# Patient Record
Sex: MALE | Race: WHITE
[De-identification: names, ages, dates, MRNs, and addresses within clinical notes are randomized per-mention and may not be internally consistent; named-entity substitution may affect disease eponyms.]

---

## 2019-04-01 ENCOUNTER — HOSPITAL ENCOUNTER (OUTPATIENT)
Dept: HOSPITAL 75 - RAD FS | Age: 79
End: 2019-04-01
Attending: NURSE PRACTITIONER
Payer: MEDICARE

## 2019-04-01 DIAGNOSIS — R05: Primary | ICD-10-CM

## 2019-04-01 DIAGNOSIS — B96.89: ICD-10-CM

## 2019-04-01 DIAGNOSIS — R09.89: ICD-10-CM

## 2019-04-01 PROCEDURE — 71046 X-RAY EXAM CHEST 2 VIEWS: CPT

## 2019-04-01 NOTE — DIAGNOSTIC IMAGING REPORT
INDICATION: Cough and congestion.



FINDINGS: The lungs are clear. The heart and vessels are normal.

There is no effusion or pneumothorax.



IMPRESSION: No acute appearing abnormality.



Dictated by: 



  Dictated on workstation # [id number]

## 2021-01-16 ENCOUNTER — HOSPITAL ENCOUNTER (EMERGENCY)
Dept: HOSPITAL 75 - ER FS | Age: 81
Discharge: HOME | End: 2021-01-16
Payer: MEDICARE

## 2021-01-16 VITALS — WEIGHT: 242.51 LBS | HEIGHT: 66.02 IN | BODY MASS INDEX: 38.97 KG/M2

## 2021-01-16 VITALS — DIASTOLIC BLOOD PRESSURE: 49 MMHG | SYSTOLIC BLOOD PRESSURE: 126 MMHG

## 2021-01-16 DIAGNOSIS — R53.1: ICD-10-CM

## 2021-01-16 DIAGNOSIS — U07.1: Primary | ICD-10-CM

## 2021-01-16 DIAGNOSIS — E86.0: ICD-10-CM

## 2021-01-16 LAB
ALBUMIN SERPL-MCNC: 3.8 GM/DL (ref 3.2–4.5)
ALP SERPL-CCNC: 57 U/L (ref 40–136)
ALT SERPL-CCNC: 46 U/L (ref 0–55)
BASOPHILS # BLD AUTO: 0 10^3/UL (ref 0–0.1)
BASOPHILS NFR BLD AUTO: 1 % (ref 0–10)
BILIRUB SERPL-MCNC: 0.6 MG/DL (ref 0.1–1)
BUN/CREAT SERPL: 16
CALCIUM SERPL-MCNC: 9.1 MG/DL (ref 8.5–10.1)
CHLORIDE SERPL-SCNC: 99 MMOL/L (ref 98–107)
CO2 SERPL-SCNC: 23 MMOL/L (ref 21–32)
CREAT SERPL-MCNC: 1.07 MG/DL (ref 0.6–1.3)
EOSINOPHIL # BLD AUTO: 0 10^3/UL (ref 0–0.3)
EOSINOPHIL NFR BLD AUTO: 0 % (ref 0–10)
GFR SERPLBLD BASED ON 1.73 SQ M-ARVRAT: > 60 ML/MIN
GLUCOSE SERPL-MCNC: 117 MG/DL (ref 70–105)
HCT VFR BLD CALC: 43 % (ref 40–54)
HGB BLD-MCNC: 14.7 G/DL (ref 13.3–17.7)
LYMPHOCYTES # BLD AUTO: 2 X 10^3 (ref 1–4)
LYMPHOCYTES NFR BLD AUTO: 34 % (ref 12–44)
MANUAL DIFFERENTIAL PERFORMED BLD QL: NO
MCH RBC QN AUTO: 33 PG (ref 25–34)
MCHC RBC AUTO-ENTMCNC: 34 G/DL (ref 32–36)
MCV RBC AUTO: 96 FL (ref 80–99)
MONOCYTES # BLD AUTO: 0.9 X 10^3 (ref 0–1)
MONOCYTES NFR BLD AUTO: 15 % (ref 0–12)
NEUTROPHILS # BLD AUTO: 3 X 10^3 (ref 1.8–7.8)
NEUTROPHILS NFR BLD AUTO: 50 % (ref 42–75)
PLATELET # BLD: 150 10^3/UL (ref 130–400)
PMV BLD AUTO: 10.5 FL (ref 7.4–10.4)
POTASSIUM SERPL-SCNC: 4.4 MMOL/L (ref 3.6–5)
PROT SERPL-MCNC: 7.4 GM/DL (ref 6.4–8.2)
SODIUM SERPL-SCNC: 135 MMOL/L (ref 135–145)
WBC # BLD AUTO: 6 10^3/UL (ref 4.3–11)

## 2021-01-16 PROCEDURE — 93005 ELECTROCARDIOGRAM TRACING: CPT

## 2021-01-16 PROCEDURE — 86141 C-REACTIVE PROTEIN HS: CPT

## 2021-01-16 PROCEDURE — 87635 SARS-COV-2 COVID-19 AMP PRB: CPT

## 2021-01-16 PROCEDURE — 71045 X-RAY EXAM CHEST 1 VIEW: CPT

## 2021-01-16 PROCEDURE — 84145 PROCALCITONIN (PCT): CPT

## 2021-01-16 PROCEDURE — 84484 ASSAY OF TROPONIN QUANT: CPT

## 2021-01-16 PROCEDURE — 85025 COMPLETE CBC W/AUTO DIFF WBC: CPT

## 2021-01-16 PROCEDURE — 99284 EMERGENCY DEPT VISIT MOD MDM: CPT

## 2021-01-16 PROCEDURE — 36415 COLL VENOUS BLD VENIPUNCTURE: CPT

## 2021-01-16 PROCEDURE — 87804 INFLUENZA ASSAY W/OPTIC: CPT

## 2021-01-16 PROCEDURE — 80053 COMPREHEN METABOLIC PANEL: CPT

## 2021-01-16 NOTE — DIAGNOSTIC IMAGING REPORT
INDICATION:  weakness.  



TECHNIQUE:  Single view chest 10:15 AM.



CORRELATION STUDY:  04/01/2019



FINDINGS: 

The heart size, mediastinal configuration and pulmonary

vascularity are within normal limits.  

Minimal opacity left lung base. Could reflect small area of

developing infiltrate. Remaining lung fields otherwise clear.



IMPRESSION: 

1. Opacity left lung base could be a developing area of

infiltrate. Follow-up imaging if clinically warranted.



Dictated by: 



  Dictated on workstation # [id number]

## 2021-01-16 NOTE — ED CARDIAC GENERAL
History of Present Illness


General


Stated Complaint:  LOW HR; ARRHYTHMIA


Source:  patient


Exam Limitations:  no limitations





History of Present Illness


Date Seen by Provider:  Jan 16, 2021


Time Seen by Provider:  10:07


Initial Comments


80-year-old male presents with 2-3 days of feeling weak and decreased appetite. 

This morning was using a pulse oximeter and stated that his heart rate was 

fluctuating between 30 and 100 and he was feeling palpitations at the time.  

Denies history of heart or lung problems, denies recent illness fever or chills.

 Denies known exposure to the [place] virus.  Only significant past medical 

history is hypertension and prostate Hypertrophy.





Allergies and Home Medications


Allergies


Coded Allergies:  


     No Known Drug Allergies (Unverified , 1/16/21)





Home Medications


Azithromycin 250 Mg Tablet, 250 MG PO UD


   TAKE 2 TABLETS ON DAY ONE THEN TAKE 1 TABLET DAILY FOR FOUR MORE DAYS 


   Prescribed by: RENY NAJERA on 1/16/21 1131





Patient Home Medication List


Home Medication List Reviewed:  Yes





Review of Systems


Review of Systems


Constitutional:  No chills, No diaphoresis; dizziness; No fever; malaise, 

weakness


EENTM:  No Symptoms Reported


Respiratory:  No Symptoms Reported; Denies Cough, Denies Shortness of Air


Cardiovascular:  Denies Chest Pain, Denies Edema; Lightheadedness, Palpitations;

Denies Syncope


Gastrointestinal:  Denies Abdomen Distended, Denies Abdominal Pain, Denies 

Constipated, Denies Nausea; Poor Appetite, Poor Fluid Intake; Denies Vomiting


Musculoskeletal:  No back pain, No joint pain, No neck pain


Skin:  No change in color, No lesions, No rash


Psychiatric/Neurological:  Denies Headache, Denies Numbness, Denies Paresthesia,

Denies Seizure, Denies Tingling, Denies Tremors; Weakness





Past Medical-Social-Family Hx


Past Med/Social Hx:  Reviewed Nursing Past Med/Soc Hx


Patient Social History


Alcohol Use:  Denies Use





Physical Exam


Vital Signs





Vital Signs - First Documented








 1/16/21





 10:08


 


Temp 37.0


 


Pulse 96


 


Resp 15


 


B/P (MAP) 134/66 (88)


 


Pulse Ox 95


 


O2 Delivery Room Air





Capillary Refill :


Height, Weight, BMI


Height: '"


Weight: lbs. oz. kg;  BMI


Method:


General Appearance:  No Apparent Distress, WD/WN


HEENT:  PERRL/EOMI, Normal ENT Inspection


Neck:  Non Tender, Supple


Respiratory:  Chest Non Tender, Lungs Clear, Normal Breath Sounds, No Accessory 

Muscle Use, No Respiratory Distress


Cardiovascular:  Regular Rate, Rhythm, No Edema, No Gallop, No JVD, No Murmur, 

Normal Peripheral Pulses


Gastrointestinal:  Normal Bowel Sounds, Non Tender, Soft


Extremity:  Normal Capillary Refill, Normal Inspection, Non Tender, No Calf 

Tenderness


Neurologic/Psychiatric:  Alert, Oriented x3, No Motor/Sensory Deficits, Normal 

Mood/Affect, CNs II-XII Norm as Tested


Skin:  Normal Color, Warm/Dry





Progress/Results/Core Measures


Results/Orders


Lab Results





Laboratory Tests








Test


 1/16/21


10:14 1/16/21


10:30 Range/Units


 


 


White Blood Count


 6.0 


 


 4.3-11.0


10^3/uL


 


Red Blood Count


 4.52 


 


 4.35-5.85


10^6/uL


 


Hemoglobin 14.7   13.3-17.7  G/DL


 


Hematocrit 43   40-54  %


 


Mean Corpuscular Volume 96   80-99  FL


 


Mean Corpuscular Hemoglobin 33   25-34  PG


 


Mean Corpuscular Hemoglobin


Concent 34 


 


 32-36  G/DL





 


Red Cell Distribution Width 12.5   10.0-14.5  %


 


Platelet Count


 150 


 


 130-400


10^3/uL


 


Mean Platelet Volume 10.5 H  7.4-10.4  FL


 


Immature Granulocyte % (Auto) 0    %


 


Neutrophils (%) (Auto) 50   42-75  %


 


Lymphocytes (%) (Auto) 34   12-44  %


 


Monocytes (%) (Auto) 15 H  0-12  %


 


Eosinophils (%) (Auto) 0   0-10  %


 


Basophils (%) (Auto) 1   0-10  %


 


Neutrophils # (Auto) 3.0   1.8-7.8  X 10^3


 


Lymphocytes # (Auto) 2.0   1.0-4.0  X 10^3


 


Monocytes # (Auto) 0.9   0.0-1.0  X 10^3


 


Eosinophils # (Auto)


 0.0 


 


 0.0-0.3


10^3/uL


 


Basophils # (Auto)


 0.0 


 


 0.0-0.1


10^3/uL


 


Immature Granulocyte # (Auto)


 0.0 


 


 0.0-0.1


10^3/uL


 


Sodium Level 135   135-145  MMOL/L


 


Potassium Level 4.4   3.6-5.0  MMOL/L


 


Chloride Level 99     MMOL/L


 


Carbon Dioxide Level 23   21-32  MMOL/L


 


Anion Gap 13   5-14  MMOL/L


 


Blood Urea Nitrogen 17   7-18  MG/DL


 


Creatinine


 1.07 


 


 0.60-1.30


MG/DL


 


Estimat Glomerular Filtration


Rate > 60 


 


  





 


BUN/Creatinine Ratio 16    


 


Glucose Level 117 H    MG/DL


 


Calcium Level 9.1   8.5-10.1  MG/DL


 


Corrected Calcium 9.3   8.5-10.1  MG/DL


 


Total Bilirubin 0.6   0.1-1.0  MG/DL


 


Aspartate Amino Transf


(AST/SGOT) 49 H


 


 5-34  U/L





 


Alanine Aminotransferase


(ALT/SGPT) 46 


 


 0-55  U/L





 


Alkaline Phosphatase 57     U/L


 


Troponin I < 0.30   <0.30  NG/ML


 


C-Reactive Protein 1.55 H  <0.50  MG/DL


 


Total Protein 7.4   6.4-8.2  GM/DL


 


Albumin 3.8   3.2-4.5  GM/DL








Micro Results





Microbiology


1/16/21 Influenza Types A,B Antigen (RUPESH) - Final, Complete


          





My Orders





Orders - RENY NAJERA DO


Ed Iv/Invasive Line Start (1/16/21 10:11)


Troponin I Fs (1/16/21 10:11)


Chest 1 View Ap/Pa Only (1/16/21 10:11)


Ekg Tracing (1/16/21 10:11)


Cbc With Automated Diff (1/16/21 10:11)


Comprehensive Metabolic Panel (1/16/21 10:11)


Urinalysis (1/16/21 10:11)


Crp Fs (1/16/21 10:24)


Procalcitonin (Pct) (1/16/21 10:24)


Coronavirus Sars-Cov-2 So 2019 (1/16/21 10:24)


Influenza A And B Antigens (1/16/21 10:28)


Ns Iv 1000 Ml (Sodium Chloride 0.9%) (1/16/21 11:00)





Vital Signs/I&O











 1/16/21 1/16/21





 10:08 11:35


 


Temp 37.0 37.0


 


Pulse 96 88


 


Resp 15 16


 


B/P (MAP) 134/66 (88) 126/49 (88)


 


Pulse Ox 95 94


 


O2 Delivery Room Air 











Progress


Progress Note :  


Progress Note


Patient without any respiratory symptoms currently or in the past few weeks.  

Overall, feeling weakness and decreased appetite for 2 weeks.  Labs are 

completely normal and CXR with questionable opacity in LLL.  Decision to treat w

Azithromycin and advise f/u w PCP in 1-2 weeks for re-evaluation.  Also, tested 

for C-19 which will be pending.  HR improved (from 100 to 80's) after 1 liter NS

iv.  





Reassurance given about his concern over a fluctuating heart rate this morning 

from his pulse oximeter, explained that it may not have been picking up his 

pulse accurately as he said it was going up and down.  Also during his entire ER

stay, he was watched on the monitor and his heart rate was consistently in the 9

0s until after he was given fluid.


Initial ECG Impression Date:  Jan 16, 2021


Initial ECG Impression Time:  10:15


Initial ECG Rhythm:  Normal Sinus


Initial ECG Intervals:  Normal


Initial ECG Impression:  Normal


Initial ECG Comparisson:  No Previous ECG Available





Diagnostic Imaging





   Diagonstic Imaging:  Xray


   Plain Films/CT/US/NM/MRI:  chest


Comments


Date of Exam:01/16/21





CHEST 1 VIEW AP/PA ONLY








INDICATION:  weakness.  





TECHNIQUE:  Single view chest 10:15 AM.





CORRELATION STUDY:  04/01/2019





FINDINGS: 


The heart size, mediastinal configuration and pulmonary


vascularity are within normal limits.  


Minimal opacity left lung base. Could reflect small area of


developing infiltrate. Remaining lung fields otherwise clear.





IMPRESSION: 


1. Opacity left lung base could be a developing area of


infiltrate. Follow-up imaging if clinically warranted.





  Dictated on workstation # [id number]








Dict:   01/16/21 1030


Trans:   01/16/21 1043


STEVEN [id number]





Interpreted by:     LILIAM ROMERO DO


Electronically signed by:





Departure


Impression





   Primary Impression:  


   Weakness


   Additional Impression:  


   Mild dehydration


Disposition:  01 HOME, SELF-CARE


Condition:  Stable





Departure-Patient Inst.


Decision time for Depature:  11:08


Referrals:  


SELFREBEKA MD (PCP/Family)


Primary Care Physician


Patient Instructions:  Dehydration, Adult ED, Weakness ED





Add. Discharge Instructions:  


See your doctor in 1 week for follow up evaluation.  It is also advised you get 

a follow up chest x-ray from your doctor in 1 or 2 weeks





The antibiotic is given you for a potential infection in your left lower lung. 





You will get a phone call regarding the results of your Covid test


Scripts


Azithromycin (Azithromycin) 250 Mg Tablet


250 MG PO UD, #6 TAB


   TAKE 2 TABLETS ON DAY ONE THEN TAKE 1 TABLET DAILY FOR FOUR MORE DAYS


   Prov: RENY NAJERA DO         1/16/21











RENY NAJERA DO         Jan 16, 2021 10:07

## 2021-01-21 ENCOUNTER — HOSPITAL ENCOUNTER (EMERGENCY)
Dept: HOSPITAL 75 - ER FS | Age: 81
Discharge: HOME | End: 2021-01-21
Payer: MEDICARE

## 2021-01-21 VITALS — SYSTOLIC BLOOD PRESSURE: 151 MMHG | DIASTOLIC BLOOD PRESSURE: 65 MMHG

## 2021-01-21 VITALS — WEIGHT: 220.46 LBS | HEIGHT: 67.72 IN | BODY MASS INDEX: 33.8 KG/M2

## 2021-01-21 DIAGNOSIS — U07.1: Primary | ICD-10-CM

## 2021-01-21 LAB
ALBUMIN SERPL-MCNC: 3.5 GM/DL (ref 3.2–4.5)
ALP SERPL-CCNC: 47 U/L (ref 40–136)
ALT SERPL-CCNC: 40 U/L (ref 0–55)
BASOPHILS # BLD AUTO: 0 10^3/UL (ref 0–0.1)
BASOPHILS NFR BLD AUTO: 0 % (ref 0–10)
BILIRUB SERPL-MCNC: 0.6 MG/DL (ref 0.1–1)
BUN/CREAT SERPL: 19
CALCIUM SERPL-MCNC: 9.2 MG/DL (ref 8.5–10.1)
CHLORIDE SERPL-SCNC: 100 MMOL/L (ref 98–107)
CO2 SERPL-SCNC: 25 MMOL/L (ref 21–32)
CREAT SERPL-MCNC: 1 MG/DL (ref 0.6–1.3)
EOSINOPHIL # BLD AUTO: 0 10^3/UL (ref 0–0.3)
EOSINOPHIL NFR BLD AUTO: 0 % (ref 0–10)
GFR SERPLBLD BASED ON 1.73 SQ M-ARVRAT: > 60 ML/MIN
GLUCOSE SERPL-MCNC: 122 MG/DL (ref 70–105)
HCT VFR BLD CALC: 40 % (ref 40–54)
HGB BLD-MCNC: 13.8 G/DL (ref 13.3–17.7)
LYMPHOCYTES # BLD AUTO: 1.2 X 10^3 (ref 1–4)
LYMPHOCYTES NFR BLD AUTO: 13 % (ref 12–44)
MANUAL DIFFERENTIAL PERFORMED BLD QL: NO
MCH RBC QN AUTO: 33 PG (ref 25–34)
MCHC RBC AUTO-ENTMCNC: 35 G/DL (ref 32–36)
MCV RBC AUTO: 94 FL (ref 80–99)
MONOCYTES # BLD AUTO: 1.1 X 10^3 (ref 0–1)
MONOCYTES NFR BLD AUTO: 11 % (ref 0–12)
NEUTROPHILS # BLD AUTO: 7.2 X 10^3 (ref 1.8–7.8)
NEUTROPHILS NFR BLD AUTO: 76 % (ref 42–75)
PLATELET # BLD: 212 10^3/UL (ref 130–400)
PMV BLD AUTO: 10.6 FL (ref 7.4–10.4)
POTASSIUM SERPL-SCNC: 4.2 MMOL/L (ref 3.6–5)
PROT SERPL-MCNC: 6.9 GM/DL (ref 6.4–8.2)
SODIUM SERPL-SCNC: 135 MMOL/L (ref 135–145)
WBC # BLD AUTO: 9.6 10^3/UL (ref 4.3–11)

## 2021-01-21 PROCEDURE — 80053 COMPREHEN METABOLIC PANEL: CPT

## 2021-01-21 PROCEDURE — 71045 X-RAY EXAM CHEST 1 VIEW: CPT

## 2021-01-21 PROCEDURE — 93041 RHYTHM ECG TRACING: CPT

## 2021-01-21 PROCEDURE — 85025 COMPLETE CBC W/AUTO DIFF WBC: CPT

## 2021-01-21 PROCEDURE — 86141 C-REACTIVE PROTEIN HS: CPT

## 2021-01-21 PROCEDURE — 93005 ELECTROCARDIOGRAM TRACING: CPT

## 2021-01-21 PROCEDURE — 84484 ASSAY OF TROPONIN QUANT: CPT

## 2021-01-21 PROCEDURE — 36415 COLL VENOUS BLD VENIPUNCTURE: CPT

## 2021-01-21 NOTE — DIAGNOSTIC IMAGING REPORT
INDICATION: Cough and COVID infection.



TECHNIQUE: Portable AP view of the chest is obtained.



FINDINGS: Since 01/16/2021, there are continued predominantly

linear markings in the lung bases which may be due to atelectasis

or possible scarring. There is no pneumothorax. No significant

lobar consolidation or pleural fluid is identified.



IMPRESSION: Basilar atelectasis and/or pneumonitis versus

developing scarring, bilaterally.



Dictated by: 



  Dictated on workstation # [id number]

## 2021-01-21 NOTE — ED GENERAL
General


Chief Complaint:  Cough/Cold/Flu Symptoms


Stated Complaint:  COUGH,DIZZINESS


Nursing Triage Note:  


PT WAS POSITIVE FOR COVID 5 DAYS AGO.  HE COMES TO ER TODAY BECAUSE HE STILL HAS




A COUGH AND FEELS DIZZY SOMETIMES AFTER COUGHING.


Nursing Sepsis Screen:  No Definite Risk


Source of Information:  Patient





History of Present Illness


Date Seen by Provider:  2021


Time Seen by Provider:  10:15


Initial Comments


80-year-old male presenting with complaints of feeling weak, dizzy at times, 

continued cough since being diagnosed with Covid when seen in ED 2021.  

He states that he completed the Z-Marky that was prescribed to him and he has 1 or

2 days of prednisone left.  Dr. Singletary had prescribed Mucinex DM as well as 

Tessalon Perles to help with his coughing.  He occasionally will bring up some 

clear or thick white sputum.  He denies any fever or chills.  He had called the 

clinic about his symptoms and they told him to come to the emergency department.





Allergies and Home Medications


Allergies


Coded Allergies:  


     No Known Drug Allergies (Unverified , 21)





Home Medications


Albuterol Sulfate 2.5 Mg/3 Ml Vial.neb, 2.5 MG INH Q4H PRN for COUGH


   Prescribed by: HARDEEP MONTOYA on 21 1133


Azithromycin 250 Mg Tablet, 250 MG PO UD


   TAKE 2 TABLETS ON DAY ONE THEN TAKE 1 TABLET DAILY FOR FOUR MORE DAYS 


   Prescribed by: RENY NAJERA on 21 1131





Patient Home Medication List


Home Medication List Reviewed:  Yes





Review of Systems


Review of Systems


Constitutional:  No chills; dizziness (Intermittent); No fever; malaise


EENTM:  nose congestion; No epistaxis


Respiratory:  see HPI, cough


Cardiovascular:  No chest pain


Gastrointestinal:  no symptoms reported


Genitourinary:  no symptoms reported


Musculoskeletal:  no symptoms reported


Skin:  no symptoms reported


Psychiatric/Neurological:  Weakness (Generalized)


Hematologic/Lymphatic:  No Symptoms Reported





Past Medical-Social-Family Hx


Past Med/Social Hx:  Reviewed Nursing Past Med/Soc Hx


Patient Social History


Alcohol Use:  Denies Use


Recent Infectious Disease Expo:  Yes


Recent Hopitalizations:  No





Seasonal Allergies


Seasonal Allergies:  No





Past Medical History


Surgeries:  Yes (Right ankle repair, Kidney stone retrieval)


Appendectomy, Orthopedic, Tonsillectomy


Respiratory:  No


Cardiac:  Yes


Hypertension


Neurological:  No


Genitourinary:  Yes


Benign Prostatic Hyperpl


Gastrointestinal:  No


Musculoskeletal:  No


Endocrine:  No


HEENT:  No


Cancer:  No


Psychosocial:  No


Blood Disorders:  No





Physical Exam


Vital Signs





Vital Signs - First Documented








 21





 10:27


 


Temp 37.6


 


Pulse 91


 


Resp 20


 


B/P (MAP) 167/67 (100)


 


Pulse Ox 100


 


O2 Delivery Room Air





Capillary Refill : Less Than 3 Seconds


Height, Weight, BMI


Height: '"


Weight: lbs. oz. kg; 33.00 BMI


Method:


General Appearance:  No Apparent Distress, WD/WN


HEENT:  PERRL/EOMI, Pharynx Normal


Neck:  Full Range of Motion, Normal Inspection, Non Tender, Supple


Respiratory:  Chest Non Tender, Lungs Clear, Normal Breath Sounds, No Accessory 

Muscle Use, No Respiratory Distress


Cardiovascular:  Regular Rate, Rhythm, No Murmur, Normal Peripheral Pulses


Gastrointestinal:  Normal Bowel Sounds, No Pulsatile Mass, Non Tender, Soft


Extremity:  Normal Capillary Refill, Non Tender


Neurologic/Psychiatric:  Alert, Oriented x3, No Motor/Sensory Deficits, Normal 

Mood/Affect, CNs II-XII Norm as Tested


Skin:  Normal Color, Warm/Dry





Progress/Results/Core Measures


Suspected Sepsis


Recent Fever Within 48 Hours:  No


Infection Criteria Present:  None


New/Unexplained  Altered Menta:  No


Sepsis Screen:  No Definite Risk


SIRS


Temperature: 


Pulse: 91 


Respiratory Rate: 20


 


Laboratory Tests


21 10:40: White Blood Count 9.6


Blood Pressure 167 /67 


Mean: 100


 





Laboratory Tests


21 10:40: 


Creatinine 1.00, Platelet Count 212, Total Bilirubin 0.6








Results/Orders


Lab Results





Laboratory Tests








Test


 21


10:40 Range/Units


 


 


White Blood Count


 9.6 


 4.3-11.0


10^3/uL


 


Red Blood Count


 4.21 L


 4.35-5.85


10^6/uL


 


Hemoglobin 13.8  13.3-17.7  G/DL


 


Hematocrit 40  40-54  %


 


Mean Corpuscular Volume 94  80-99  FL


 


Mean Corpuscular Hemoglobin 33  25-34  PG


 


Mean Corpuscular Hemoglobin


Concent 35 


 32-36  G/DL





 


Red Cell Distribution Width 12.4  10.0-14.5  %


 


Platelet Count


 212 


 130-400


10^3/uL


 


Mean Platelet Volume 10.6 H 7.4-10.4  FL


 


Immature Granulocyte % (Auto) 0   %


 


Neutrophils (%) (Auto) 76 H 42-75  %


 


Lymphocytes (%) (Auto) 13  12-44  %


 


Monocytes (%) (Auto) 11  0-12  %


 


Eosinophils (%) (Auto) 0  0-10  %


 


Basophils (%) (Auto) 0  0-10  %


 


Neutrophils # (Auto) 7.2  1.8-7.8  X 10^3


 


Lymphocytes # (Auto) 1.2  1.0-4.0  X 10^3


 


Monocytes # (Auto) 1.1 H 0.0-1.0  X 10^3


 


Eosinophils # (Auto)


 0.0 


 0.0-0.3


10^3/uL


 


Basophils # (Auto)


 0.0 


 0.0-0.1


10^3/uL


 


Immature Granulocyte # (Auto)


 0.0 


 0.0-0.1


10^3/uL


 


Sodium Level 135  135-145  MMOL/L


 


Potassium Level 4.2  3.6-5.0  MMOL/L


 


Chloride Level 100    MMOL/L


 


Carbon Dioxide Level 25  21-32  MMOL/L


 


Anion Gap 10  5-14  MMOL/L


 


Blood Urea Nitrogen 19 H 7-18  MG/DL


 


Creatinine


 1.00 


 0.60-1.30


MG/DL


 


Estimat Glomerular Filtration


Rate > 60 


  





 


BUN/Creatinine Ratio 19   


 


Glucose Level 122 H   MG/DL


 


Calcium Level 9.2  8.5-10.1  MG/DL


 


Corrected Calcium 9.6  8.5-10.1  MG/DL


 


Total Bilirubin 0.6  0.1-1.0  MG/DL


 


Aspartate Amino Transf


(AST/SGOT) 36 H


 5-34  U/L





 


Alanine Aminotransferase


(ALT/SGPT) 40 


 0-55  U/L





 


Alkaline Phosphatase 47    U/L


 


Troponin I < 0.30  <0.30  NG/ML


 


C-Reactive Protein 2.22 H <0.50  MG/DL


 


Total Protein 6.9  6.4-8.2  GM/DL


 


Albumin 3.5  3.2-4.5  GM/DL








My Orders





Orders - HARDEEP MONTOYA MD


Monitor-Rhythm Ecg Trace Only (21 10:38)


Ed Iv/Invasive Line Start (21 10:38)


Cbc With Automated Diff (21 10:38)


Comprehensive Metabolic Panel (21 10:38)


Crp Fs (21 10:38)


Troponin I Fs (21 10:38)


Ekg Tracing (21 10:38)


Ns Iv 1000 Ml (Sodium Chloride 0.9%) (21 10:45)


Rx-Albuterol Inhaler (Rx-Ventolin Hfa) (21 10:45)


Chest 1 View Ap/Pa Only (21 11:01)





Medications Given in ED





Current Medications








 Medications  Dose


 Ordered  Sig/Mk


 Route  Start Time


 Stop Time Status Last Admin


Dose Admin


 


 Albuterol Sulfate  2 PUFFS IH


 Q1HR STA  RTQ4HR  PRN


 IH  21 10:45


 21 11:47 DC 21 10:49


18 GM








Vital Signs/I&O











 21





 10:27 11:39


 


Temp 37.6 36.5


 


Pulse 91 77


 


Resp 20 16


 


B/P (MAP) 167/67 (100) 151/65


 


Pulse Ox 100 100


 


O2 Delivery Room Air Room Air





Capillary Refill : Less Than 3 Seconds








Blood Pressure Mean:                    100








Progress Note #1:  


Progress Note


Check basic labs and x-ray.  Give a liter of normal saline to try and help with 

hydration.  Patient's oxygen saturation is 100% on room air.  His lungs are 

clear on exam.  We will see what his chest x-ray shows as well as making sure 

that his electrolytes appear okay.  Adding an inhaler and breathing treatments 

may help with his cough.  Encouraged to continue the Mucinex DM and Tessalon 

Perles as needed.


Progress Note #2:  


Progress Note


CBC is not showing any acute significant abnormality.  His white count is 9.6 

with a slight left shift.  His electrocardiogram shows sinus rhythm without isch

emic changes.  He does state that he has a nebulizer at home but has not been 

using it because he was not told to use it for his symptoms


Progress Note #3:  


   Time:  11:23


Progress Note


Chemistry without acute significant abnormality as well. He does have elevated 

CRP to go with Covid diagnosis. CXR appears stable from 2021. Will d

ischarge to home and have him use nebulizer and inhaler with spacer to help with

cough. Encourage fluids and rest. Counseled that Covid will make him feel bad 

and can take a while to recover from it. Every one is different but continue 

with symptomatic treatment for his cough and push fluids and rest as well as 

Vitamin C to help recover from viral infection.





ECG


Initial ECG Impression Date:  2021


Initial ECG Impression Time:  10:44


Initial ECG Rate:  81


Initial ECG Rhythm:  Normal Sinus


Initial ECG Comparisson:  No Previous ECG Available


Comment


Normal sinus rhythm with a heart rate of 81 bpm.  IA interval 154 ms.  QT 

interval 357 ms with a QTc interval 415 ms.  There is no acute ST elevation.  

There is no prior tracing available for comparison.





Diagnostic Imaging





   Diagonstic Imaging:  Xray


   Plain Films/CT/US/NM/MRI:  chest


Comments


                 [facility]


                                [place], [place]





NAME:   ROMAN PAINTING


[facility] REC#:   [id number]


ACCOUNT#:   [id number]


PT STATUS:   DEP ER


:   1940


PHYSICIAN:   HARDEEP MONTOYA MD


ADMIT DATE:   21/ER FS


                                  ***Signed***


Date of Exam:21





CHEST 1 VIEW AP/PA ONLY








INDICATION: Cough and COVID infection.





TECHNIQUE: Portable AP view of the chest is obtained.





FINDINGS: Since 2021, there are continued predominantly


linear markings in the lung bases which may be due to atelectasis


or possible scarring. There is no pneumothorax. No significant


lobar consolidation or pleural fluid is identified.





IMPRESSION: Basilar atelectasis and/or pneumonitis versus


developing scarring, bilaterally.





Dictated by: 





  Dictated on workstation # [id number]








Dict:   21 1134


Trans:   21 1154


AS6 [id number]





Interpreted by:     MARGARITA MOLINA MD


Electronically signed by: MARGARITA MOLINA MD 21 1154





Departure


Impression





   Primary Impression:  


   Cough


   Additional Impression:  


   COVID-19 virus infection


Disposition:   HOME, SELF-CARE


Condition:  Stable





Departure-Patient Inst.


Decision time for Depature:  11:35


Referrals:  


REBKEA SINGLETARY MD (PCP/Family)


Primary Care Physician


Patient Instructions:  Coronavirus Disease 2019 (COVID-19) ED, Cough, Adult ED, 

How to Use Your Metered Dose Inhaler (Adults), How to Use a Spacer





Add. Discharge Instructions:  


Use inhaler with spacer or your nebulizer machine to do breathing treatments 

every 4 to 6 hours as needed to help with cough and shortness of breath.





Follow up with Dr. Singletary and [facility] clinic for continued concerns.





Continue with cough medicine as prescribed from Dr. Singletary. Continue with 

humidifier at the bedside. 


Make sure you are drinking plenty of fluids and staying well hydrated. 





All discharge instructions reviewed with patient and/or family. Voiced 

understanding.


Scripts


Albuterol Sulfate (Albuterol Sulfate) 2.5 Mg/3 Ml Vial.neb


2.5 MG INH Q4H PRN for COUGH for 10 Days, #75 ML 1 Refill


   Prov: HARDEEP MONTOYA MD         21











HARDEEP MONTOYA MD               2021 10:40

## 2022-03-31 ENCOUNTER — HOSPITAL ENCOUNTER (OUTPATIENT)
Dept: HOSPITAL 75 - ONC | Age: 82
End: 2022-03-31
Attending: RADIOLOGY
Payer: MEDICARE

## 2022-03-31 DIAGNOSIS — C20: Primary | ICD-10-CM

## 2022-03-31 PROCEDURE — 99204 OFFICE O/P NEW MOD 45 MIN: CPT

## 2022-04-12 ENCOUNTER — HOSPITAL ENCOUNTER (EMERGENCY)
Dept: HOSPITAL 75 - ER | Age: 82
Discharge: HOME | End: 2022-04-12
Payer: MEDICARE

## 2022-04-12 VITALS — WEIGHT: 199.96 LBS | BODY MASS INDEX: 32.14 KG/M2 | HEIGHT: 66.1 IN

## 2022-04-12 VITALS — SYSTOLIC BLOOD PRESSURE: 133 MMHG | DIASTOLIC BLOOD PRESSURE: 81 MMHG

## 2022-04-12 DIAGNOSIS — I47.1: ICD-10-CM

## 2022-04-12 DIAGNOSIS — C18.9: Primary | ICD-10-CM

## 2022-04-12 LAB
ALBUMIN SERPL-MCNC: 3.6 GM/DL (ref 3.2–4.5)
ALP SERPL-CCNC: 67 U/L (ref 40–136)
ALT SERPL-CCNC: 53 U/L (ref 0–55)
APTT BLD: 41 SEC (ref 24–35)
BASOPHILS # BLD AUTO: 0.1 10^3/UL (ref 0–0.1)
BASOPHILS NFR BLD AUTO: 1 % (ref 0–10)
BILIRUB SERPL-MCNC: 0.6 MG/DL (ref 0.1–1)
BUN/CREAT SERPL: 15
CALCIUM SERPL-MCNC: 9.6 MG/DL (ref 8.5–10.1)
CHLORIDE SERPL-SCNC: 103 MMOL/L (ref 98–107)
CO2 SERPL-SCNC: 23 MMOL/L (ref 21–32)
CREAT SERPL-MCNC: 1.04 MG/DL (ref 0.6–1.3)
EOSINOPHIL # BLD AUTO: 0.4 10^3/UL (ref 0–0.3)
EOSINOPHIL NFR BLD AUTO: 4 % (ref 0–10)
GFR SERPLBLD BASED ON 1.73 SQ M-ARVRAT: 71 ML/MIN
GLUCOSE SERPL-MCNC: 120 MG/DL (ref 70–105)
HCT VFR BLD CALC: 41 % (ref 40–54)
HGB BLD-MCNC: 13.7 G/DL (ref 13.3–17.7)
INR PPP: 1.1 (ref 0.8–1.4)
LYMPHOCYTES # BLD AUTO: 2.4 10^3/UL (ref 1–4)
LYMPHOCYTES NFR BLD AUTO: 21 % (ref 12–44)
MAGNESIUM SERPL-MCNC: 2.4 MG/DL (ref 1.6–2.4)
MANUAL DIFFERENTIAL PERFORMED BLD QL: NO
MCH RBC QN AUTO: 31 PG (ref 25–34)
MCHC RBC AUTO-ENTMCNC: 33 G/DL (ref 32–36)
MCV RBC AUTO: 94 FL (ref 80–99)
MONOCYTES # BLD AUTO: 1.5 10^3/UL (ref 0–1)
MONOCYTES NFR BLD AUTO: 13 % (ref 0–12)
NEUTROPHILS # BLD AUTO: 7.2 10^3/UL (ref 1.8–7.8)
NEUTROPHILS NFR BLD AUTO: 61 % (ref 42–75)
PLATELET # BLD: 333 10^3/UL (ref 130–400)
PMV BLD AUTO: 10.1 FL (ref 9–12.2)
POTASSIUM SERPL-SCNC: 4.3 MMOL/L (ref 3.6–5)
PROT SERPL-MCNC: 7.3 GM/DL (ref 6.4–8.2)
PROTHROMBIN TIME: 14.2 SEC (ref 12.2–14.7)
SODIUM SERPL-SCNC: 138 MMOL/L (ref 135–145)
WBC # BLD AUTO: 11.7 10^3/UL (ref 4.3–11)

## 2022-04-12 PROCEDURE — 71045 X-RAY EXAM CHEST 1 VIEW: CPT

## 2022-04-12 PROCEDURE — 93005 ELECTROCARDIOGRAM TRACING: CPT

## 2022-04-12 PROCEDURE — 93041 RHYTHM ECG TRACING: CPT

## 2022-04-12 PROCEDURE — 84484 ASSAY OF TROPONIN QUANT: CPT

## 2022-04-12 PROCEDURE — 83735 ASSAY OF MAGNESIUM: CPT

## 2022-04-12 PROCEDURE — 85610 PROTHROMBIN TIME: CPT

## 2022-04-12 PROCEDURE — 85730 THROMBOPLASTIN TIME PARTIAL: CPT

## 2022-04-12 PROCEDURE — 83874 ASSAY OF MYOGLOBIN: CPT

## 2022-04-12 PROCEDURE — 36415 COLL VENOUS BLD VENIPUNCTURE: CPT

## 2022-04-12 PROCEDURE — 80053 COMPREHEN METABOLIC PANEL: CPT

## 2022-04-12 PROCEDURE — 85025 COMPLETE CBC W/AUTO DIFF WBC: CPT

## 2022-04-12 NOTE — ED CARDIAC GENERAL
History of Present Illness


General


Chief Complaint:  Cardiac/General Problems


Stated Complaint:  TACHYCARDIA


Source:  patient


Exam Limitations:  no limitations





History of Present Illness


Date Seen by Provider:  2022


Time Seen by Provider:  14:18


Initial Comments


Patient to the ER by private conveyance from cancer center Dr. Jernigan where he

was receiving radiation therapy and chemotherapy for colorectal cancer.  He is 

having some shortness of breath and palpitations and fast running heart rate.  

No chest pain.  No syncope or near syncope.  While sitting in the bed he denies 

any shortness of air.  He has not had a cough fever chills nausea or vomiting.  

He did recently get over bronchitis a few weeks ago.  He says in the past he has

had some rapid heart rates it would make him short of breath and even wore a 

monitor for a cardiologist in [place] but they never discovered it and he has 

never had to be chemically or electrically cardioverted in the past.  He does 

not have any known heart history other than high blood pressure, borderline 

diabetes and hyperlipidemia.  He is not a smoker.  He does however have a 

history of COPD





Allergies and Home Medications


Allergies


Coded Allergies:  


     No Known Drug Allergies (Unverified , 21)





Patient Home Medication List


Home Medication List Reviewed:  Yes


Albuterol Sulfate (Albuterol Sulfate) 2.5 Mg/3 Ml Vial.neb, 2.5 MG INH Q4H PRN 

for COUGH


   Prescribed by: HARDEEP MONTOYA on 21 1133


Azithromycin (Azithromycin) 250 Mg Tablet, 250 MG PO UD


   Prescribed by: RENY NAJERA on 21 1131





Review of Systems


Review of Systems


Constitutional:  No chills, No diaphoresis


EENTM:  No Blurred Vision, No Double Vision


Respiratory:  Denies Cough, Denies Shortness of Air; SOA With Exertion; Denies 

SOA at Rest


Cardiovascular:  See HPI; Denies Chest Pain, Denies Irregular Heart Rate; 

Lightheadedness; Denies Syncope


Gastrointestinal:  Denies Abdomen Distended, Denies Abdominal Pain


Genitourinary:  Denies Burning, Denies Discharge


Musculoskeletal:  No back pain, No joint pain


Skin:  No change in color, No pruritus, No rash


Psychiatric/Neurological:  Denies Headache, Denies Numbness





All Other Systems Reviewed


Negative Unless Noted:  Yes





Past Medical-Social-Family Hx


Patient Social History


Tobacco Use?:  No


Use of E-Cig and/or Vaping dev:  No


Substance use?:  No





Seasonal Allergies


Seasonal Allergies:  No





Past Medical History


Surgeries:  Yes (Right ankle repair, Kidney stone retrieval)


Appendectomy, Orthopedic, Tonsillectomy


Respiratory:  No


Cardiac:  Yes


Hypertension


Neurological:  No


Genitourinary:  Yes


Benign Prostatic Hyperpl


Gastrointestinal:  No


Musculoskeletal:  No


Endocrine:  No


HEENT:  No


Cancer:  No


Psychosocial:  No


Blood Disorders:  No





Physical Exam


Vital Signs





Vital Signs - First Documented








 22





 14:05


 


Temp 36.6


 


Pulse 181


 


Resp 24


 


B/P (MAP) 136/102 (113)


 


Pulse Ox 94





Capillary Refill :


Height, Weight, BMI


Height: '"


Weight: lbs. oz. kg; 33.00 BMI


Method:


General Appearance:  No Apparent Distress, WD/WN


HEENT:  PERRL/EOMI, Pharynx Normal, Moist Mucous Membranes


Neck:  Full Range of Motion, Normal Inspection, Non Tender


Respiratory:  Lungs Clear, Normal Breath Sounds, No Accessory Muscle Use, No Res

piratory Distress


Cardiovascular:  No Edema, No Murmur, Normal Peripheral Pulses, Tachycardia (1 

80-1 90)


Gastrointestinal:  Normal Bowel Sounds, No Organomegaly, Non Tender, Soft


Extremity:  Normal Capillary Refill, Normal Inspection, Non Tender, No Calf 

Tenderness, No Pedal Edema


Neurologic/Psychiatric:  Alert, Oriented x3, No Motor/Sensory Deficits, Normal 

Mood/Affect, CNs II-XII Norm as Tested


Skin:  Normal Color, Warm/Dry





Progress/Results/Core Measures


Results/Orders


Lab Results





Laboratory Tests








Test


 22


14:11 Range/Units


 


 


White Blood Count


 11.7 H


 4.3-11.0


10^3/uL


 


Red Blood Count


 4.36 


 4.30-5.52


10^6/uL


 


Hemoglobin 13.7  13.3-17.7  g/dL


 


Hematocrit 41  40-54  %


 


Mean Corpuscular Volume 94  80-99  fL


 


Mean Corpuscular Hemoglobin 31  25-34  pg


 


Mean Corpuscular Hemoglobin


Concent 33 


 32-36  g/dL





 


Red Cell Distribution Width 13.6  10.0-14.5  %


 


Platelet Count


 333 


 130-400


10^3/uL


 


Mean Platelet Volume 10.1  9.0-12.2  fL


 


Immature Granulocyte % (Auto) 0   %


 


Neutrophils (%) (Auto) 61  42-75  %


 


Lymphocytes (%) (Auto) 21  12-44  %


 


Monocytes (%) (Auto) 13 H 0-12  %


 


Eosinophils (%) (Auto) 4  0-10  %


 


Basophils (%) (Auto) 1  0-10  %


 


Neutrophils # (Auto)


 7.2 


 1.8-7.8


10^3/uL


 


Lymphocytes # (Auto)


 2.4 


 1.0-4.0


10^3/uL


 


Monocytes # (Auto)


 1.5 H


 0.0-1.0


10^3/uL


 


Eosinophils # (Auto)


 0.4 H


 0.0-0.3


10^3/uL


 


Basophils # (Auto)


 0.1 


 0.0-0.1


10^3/uL


 


Immature Granulocyte # (Auto)


 0.1 


 0.0-0.1


10^3/uL


 


Prothrombin Time 14.2  12.2-14.7  SEC


 


INR Comment 1.1  0.8-1.4  


 


Activated Partial


Thromboplast Time 41 H


 24-35  SEC





 


Sodium Level 138  135-145  MMOL/L


 


Potassium Level 4.3  3.6-5.0  MMOL/L


 


Chloride Level 103    MMOL/L


 


Carbon Dioxide Level 23  21-32  MMOL/L


 


Anion Gap 12  5-14  MMOL/L


 


Blood Urea Nitrogen 16  7-18  MG/DL


 


Creatinine


 1.04 


 0.60-1.30


MG/DL


 


Estimat Glomerular Filtration


Rate 71 


  





 


BUN/Creatinine Ratio 15   


 


Glucose Level 120 H   MG/DL


 


Calcium Level 9.6  8.5-10.1  MG/DL


 


Corrected Calcium 9.9  8.5-10.1  MG/DL


 


Magnesium Level 2.4  1.6-2.4  MG/DL


 


Total Bilirubin 0.6  0.1-1.0  MG/DL


 


Aspartate Amino Transf


(AST/SGOT) 45 H


 5-34  U/L





 


Alanine Aminotransferase


(ALT/SGPT) 53 


 0-55  U/L





 


Alkaline Phosphatase 67    U/L


 


Myoglobin


 179.3 H


 10.0-92.0


NG/ML


 


Troponin I < 0.028  <0.028  NG/ML


 


Total Protein 7.3  6.4-8.2  GM/DL


 


Albumin 3.6  3.2-4.5  GM/DL








My Orders





Orders - FELISHA LEWIS


Adenosine Injection (Adenocard Injection (22 14:16)


Cbc With Automated Diff (22 14:34)


Magnesium (22 14:34)


Chest 1 View, Ap/Pa Only (22 14:34)


Ekg Tracing (22 14:34)


Comprehensive Metabolic Panel (22 14:34)


Myoglobin Serum (22 14:34)


Protime With Inr (22 14:34)


Partial Thromboplastin Time (22 14:34)


O2 (22 14:34)


Monitor-Rhythm Ecg Trace Only (22 14:34)


Lipid Panel (22 06:00)


Ed Iv/Invasive Line Start (22 14:34)


Troponin I Walter (22 14:34)


Adenosine Injection (Adenocard Injection (22 14:34)


Aspirin Chewable Tablet (Baby Aspirin Ch (22 14:45)


Ekg Tracing (22 14:34)





Medications Given in ED





Current Medications








 Medications  Dose


 Ordered  Sig/Mk


 Route  Start Time


 Stop Time Status Last Admin


Dose Admin


 


 Aspirin  324 mg  ONCE  ONCE


 PO  22 14:45


 22 14:46 DC 22 14:45


324 MG








Vital Signs/I&O











 22





 14:05


 


Temp 36.6


 


Pulse 181


 


Resp 24


 


B/P (MAP) 136/102 (113)


 


Pulse Ox 94











Progress


Progress Note :  


   Time:  14:44


Progress Note


He was immediately brought back in the ER an EKG was obtained demonstrating SVT.

 Given his history we elected to chemically cardiovert him.  He had a blood 

pressure 136/80.  We gave him 6 mg IV adenosine and watched him convert into a 

sinus rhythm around 100.


Initial ECG Impression Date:  2022


Initial ECG Impression Time:  14:14


Initial ECG Rate:  186


Initial ECG Rhythm:  SVT


Initial ECG Intervals:  QT (459)


Initial ECG Impression:  SVT


Comment


SVT without recognizable ST elevation or depression.


EKG #1:  


   EKG Time:  14:17


   Rate:  185


   Rhythm:  SVT


   ECG Comparisson:  Changed


   ECG Impression:  SVT


Comment


SVT with transition time after pushing 6 mg of Adenocard to a sinus rhythm rate 

around 100.


EKG #2:  


   EKG Time:  14:20


   Rate:  91


   Rhythm:  Normal Sinus


   Intervals:  Normal


   ECG Comparisson:  Changed


   ECG Impression:  Normal


Comment


Normal sinus rhythm without clinically relevant ST elevation or depression.





Diagnostic Imaging





   Diagonstic Imaging:  Xray


   Plain Films/CT/US/NM/MRI:  chest


Comments


                 [facility] [place] [place]





NAME:   ROMAN PAINTING


[facility] REC#:   [id number]


ACCOUNT#:   [id number]


PT STATUS:   REG ER


:   1940


PHYSICIAN:   FELISHA LEWIS MD


ADMIT DATE:   22/ER


                                   ***Draft***


Date of Exam:22





CHEST 1 VIEW, AP/PA ONLY








Indication: Tachycardia.





Time of Exam: 3:09 PM





Correlation is made with prior chest 2021.





Finding: The heart size is normal. The pulmonary vascularity is


unremarkable. The lungs are clear. No infiltrate, effusion or


pneumothorax is detected.





Impression: No acute cardiopulmonary process is detected.





  Dictated on workstation # [id number]








Dict:   22 1522


Trans:   22 1522


[facility] [id number]





Interpreted by:     OPAL WRIGHT MD


Electronically signed by:


   Reviewed:  Reviewed by Me


Consults :  


   Consulting Physician:  RENETTA GONZALEZ MD


Consults Notes


Discussed the case with Dr. Gonzalez, cardiology and if the patient is asymptomatic

and not dehydrated he would recommend outpatient follow-up in the clinic.





Departure


Impression





   Primary Impression:  


   Paroxysmal SVT (supraventricular tachycardia)


Disposition:  01 HOME, SELF-CARE


Condition:  Stable





Departure-Patient Inst.


Decision time for Depature:  16:03


Referrals:  


RENETTA GONZALEZ MD, MAXWELL MD (PCP/Family)


Primary Care Physician


Patient Instructions:  Supraventricular Tachycardia (SVT)





Add. Discharge Instructions:  


Call Dr. Gonzalez's office during business hours and set up a follow-up appointment

sometime this week.


Return to the ER promptly if you experience chest pain or profound shortness of 

air.


If you go back into a rapid heart rate then imagine you are blowing air through 

a coffee stirrer straw across the room to blow out a candle.  Do this once or 

twice a minute for 10 to 15 minutes.  If you cannot get the rhythm to convert b

ack to a normal rate on your own then return to the ER and we will help you.


You may collect a copy of your records from medical records to take with you or 

have faxed to Dr. Ceballos at .


Make sure you are drinking plenty of fluids over the next couple days.


All discharge instructions reviewed with patient and/or family. Voiced 

understanding.





Copy


Copies To 1:   RENETTA GONZALEZ MD; CORBIN SCHWARTZ TITUS J                 2022 14:42

## 2022-04-12 NOTE — DIAGNOSTIC IMAGING REPORT
Indication: Tachycardia.



Time of Exam: 3:09 PM



Correlation is made with prior chest 01/21/2021.



Finding: The heart size is normal. The pulmonary vascularity is

unremarkable. The lungs are clear. No infiltrate, effusion or

pneumothorax is detected.



Impression: No acute cardiopulmonary process is detected.



Dictated by: 



  Dictated on workstation # [id number]

## 2022-04-29 ENCOUNTER — HOSPITAL ENCOUNTER (OUTPATIENT)
Dept: HOSPITAL 75 - ONC | Age: 82
LOS: 1 days | Discharge: HOME | End: 2022-04-30
Attending: RADIOLOGY
Payer: MEDICARE

## 2022-04-29 DIAGNOSIS — Z51.0: Primary | ICD-10-CM

## 2022-04-29 DIAGNOSIS — Z79.82: ICD-10-CM

## 2022-04-29 DIAGNOSIS — Z79.899: ICD-10-CM

## 2022-04-29 DIAGNOSIS — E78.2: ICD-10-CM

## 2022-04-29 DIAGNOSIS — Z86.16: ICD-10-CM

## 2022-04-29 DIAGNOSIS — I10: ICD-10-CM

## 2022-04-29 DIAGNOSIS — C20: ICD-10-CM

## 2022-04-29 DIAGNOSIS — N40.0: ICD-10-CM

## 2022-04-29 PROCEDURE — 77336 RADIATION PHYSICS CONSULT: CPT

## 2022-04-29 PROCEDURE — 77470 SPECIAL RADIATION TREATMENT: CPT

## 2022-04-29 PROCEDURE — 77300 RADIATION THERAPY DOSE PLAN: CPT

## 2022-04-29 PROCEDURE — 77338 DESIGN MLC DEVICE FOR IMRT: CPT

## 2022-04-29 PROCEDURE — 77386: CPT

## 2022-04-29 PROCEDURE — 77301 RADIOTHERAPY DOSE PLAN IMRT: CPT

## 2022-05-19 ENCOUNTER — HOSPITAL ENCOUNTER (OUTPATIENT)
Dept: HOSPITAL 75 - ONC | Age: 82
LOS: 12 days | Discharge: HOME | End: 2022-05-31
Attending: RADIOLOGY
Payer: MEDICARE

## 2022-05-19 DIAGNOSIS — E78.2: ICD-10-CM

## 2022-05-19 DIAGNOSIS — I10: ICD-10-CM

## 2022-05-19 DIAGNOSIS — N40.0: ICD-10-CM

## 2022-05-19 DIAGNOSIS — C20: ICD-10-CM

## 2022-05-19 DIAGNOSIS — Z86.16: ICD-10-CM

## 2022-05-19 DIAGNOSIS — Z79.82: ICD-10-CM

## 2022-05-19 DIAGNOSIS — Z79.899: ICD-10-CM

## 2022-05-19 DIAGNOSIS — Z51.0: Primary | ICD-10-CM

## 2022-05-19 PROCEDURE — 77385: CPT

## 2022-05-19 PROCEDURE — 77336 RADIATION PHYSICS CONSULT: CPT

## 2022-05-19 PROCEDURE — 77386: CPT

## 2022-05-23 ENCOUNTER — HOSPITAL ENCOUNTER (OUTPATIENT)
Dept: HOSPITAL 75 - SLEEP | Age: 82
End: 2022-05-23
Attending: INTERNAL MEDICINE
Payer: MEDICARE

## 2022-05-23 ENCOUNTER — HOSPITAL ENCOUNTER (OUTPATIENT)
Dept: HOSPITAL 75 - CARD | Age: 82
End: 2022-05-23
Attending: INTERNAL MEDICINE
Payer: MEDICARE

## 2022-05-23 DIAGNOSIS — I11.9: Primary | ICD-10-CM

## 2022-05-23 DIAGNOSIS — G47.33: Primary | ICD-10-CM

## 2022-05-23 DIAGNOSIS — G47.36: ICD-10-CM

## 2022-05-23 DIAGNOSIS — I08.0: ICD-10-CM

## 2022-05-23 PROCEDURE — 93306 TTE W/DOPPLER COMPLETE: CPT

## 2022-07-07 ENCOUNTER — HOSPITAL ENCOUNTER (OUTPATIENT)
Dept: HOSPITAL 75 - RT | Age: 82
End: 2022-07-07
Attending: NURSE PRACTITIONER
Payer: MEDICARE

## 2022-07-07 ENCOUNTER — HOSPITAL ENCOUNTER (OUTPATIENT)
Dept: HOSPITAL 75 - ONC | Age: 82
LOS: 24 days | Discharge: HOME | End: 2022-07-31
Attending: RADIOLOGY
Payer: MEDICARE

## 2022-07-07 DIAGNOSIS — I10: ICD-10-CM

## 2022-07-07 DIAGNOSIS — E78.2: ICD-10-CM

## 2022-07-07 DIAGNOSIS — C19: Primary | ICD-10-CM

## 2022-07-07 DIAGNOSIS — J30.9: Primary | ICD-10-CM

## 2022-07-07 DIAGNOSIS — I25.10: ICD-10-CM

## 2022-07-07 PROCEDURE — 94726 PLETHYSMOGRAPHY LUNG VOLUMES: CPT

## 2022-07-07 PROCEDURE — 94060 EVALUATION OF WHEEZING: CPT

## 2022-07-07 PROCEDURE — 94729 DIFFUSING CAPACITY: CPT

## 2022-07-07 PROCEDURE — 99213 OFFICE O/P EST LOW 20 MIN: CPT

## 2022-07-24 ENCOUNTER — HOSPITAL ENCOUNTER (INPATIENT)
Dept: HOSPITAL 75 - IRF | Age: 82
LOS: 15 days | Discharge: HOME HEALTH | DRG: 92 | End: 2022-08-08
Attending: INTERNAL MEDICINE | Admitting: INTERNAL MEDICINE
Payer: MEDICARE

## 2022-07-24 VITALS — WEIGHT: 208.56 LBS | HEIGHT: 66.02 IN | BODY MASS INDEX: 33.52 KG/M2

## 2022-07-24 DIAGNOSIS — G72.89: Primary | ICD-10-CM

## 2022-07-24 DIAGNOSIS — G47.34: ICD-10-CM

## 2022-07-24 DIAGNOSIS — R21: ICD-10-CM

## 2022-07-24 DIAGNOSIS — I10: ICD-10-CM

## 2022-07-24 DIAGNOSIS — Z88.8: ICD-10-CM

## 2022-07-24 DIAGNOSIS — R54: ICD-10-CM

## 2022-07-24 DIAGNOSIS — Z79.82: ICD-10-CM

## 2022-07-24 DIAGNOSIS — U09.9: ICD-10-CM

## 2022-07-24 DIAGNOSIS — R19.7: ICD-10-CM

## 2022-07-24 DIAGNOSIS — Z90.49: ICD-10-CM

## 2022-07-24 DIAGNOSIS — J30.2: ICD-10-CM

## 2022-07-24 DIAGNOSIS — C20: ICD-10-CM

## 2022-07-24 DIAGNOSIS — R63.4: ICD-10-CM

## 2022-07-24 DIAGNOSIS — R13.10: ICD-10-CM

## 2022-07-24 DIAGNOSIS — Z93.2: ICD-10-CM

## 2022-07-24 DIAGNOSIS — E78.5: ICD-10-CM

## 2022-07-24 DIAGNOSIS — Z88.5: ICD-10-CM

## 2022-07-24 DIAGNOSIS — D50.0: ICD-10-CM

## 2022-07-24 DIAGNOSIS — K21.9: ICD-10-CM

## 2022-07-24 DIAGNOSIS — N40.0: ICD-10-CM

## 2022-07-24 DIAGNOSIS — Z79.899: ICD-10-CM

## 2022-07-24 PROCEDURE — 83735 ASSAY OF MAGNESIUM: CPT

## 2022-07-24 PROCEDURE — 80053 COMPREHEN METABOLIC PANEL: CPT

## 2022-07-24 PROCEDURE — 76937 US GUIDE VASCULAR ACCESS: CPT

## 2022-07-24 PROCEDURE — 36415 COLL VENOUS BLD VENIPUNCTURE: CPT

## 2022-07-24 PROCEDURE — 36410 VNPNXR 3YR/> PHY/QHP DX/THER: CPT

## 2022-07-24 PROCEDURE — 85025 COMPLETE CBC W/AUTO DIFF WBC: CPT

## 2022-08-01 VITALS — DIASTOLIC BLOOD PRESSURE: 69 MMHG | SYSTOLIC BLOOD PRESSURE: 140 MMHG

## 2022-08-01 VITALS — DIASTOLIC BLOOD PRESSURE: 60 MMHG | SYSTOLIC BLOOD PRESSURE: 130 MMHG

## 2022-08-01 RX ADMIN — DOCUSATE SODIUM SCH MG: 100 CAPSULE ORAL at 19:29

## 2022-08-01 RX ADMIN — DIPHENOXYLATE HYDROCHLORIDE AND ATROPINE SULFATE SCH EA: 2.5; .025 TABLET ORAL at 20:49

## 2022-08-01 RX ADMIN — OMEGA-3 FATTY ACIDS CAP 1000 MG SCH MG: 1000 CAP at 20:48

## 2022-08-01 RX ADMIN — LOPERAMIDE HYDROCHLORIDE SCH MG: 2 CAPSULE ORAL at 17:17

## 2022-08-01 RX ADMIN — LOPERAMIDE HYDROCHLORIDE SCH MG: 2 CAPSULE ORAL at 20:48

## 2022-08-01 RX ADMIN — POLYETHYLENE GLYCOL (3350) SCH GM: 17 POWDER, FOR SOLUTION ORAL at 19:30

## 2022-08-01 RX ADMIN — DOCUSATE SODIUM AND SENNOSIDES SCH EA: 8.6; 5 TABLET, FILM COATED ORAL at 19:30

## 2022-08-01 RX ADMIN — BACITRACIN SCH EACH: 500 OINTMENT TOPICAL at 20:49

## 2022-08-01 NOTE — PHYSICAL THERAPY EVALUATION
PT Evaluation-General


Medical Diagnosis


Admission Date


Aug 1, 2022 at 13:58


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


Onset Date:  2022





Therapy Diagnosis


Therapy Diagnosis:  impaired mobility





Precautions


Precautions/Isolations:  Fall Prevention, Standard Precautions





Medical History


Pertinent Medical History:  HTN


Reviewed History:  Yes





Social History


Home:  Single Level


Current Living Status:  Spouse


Entry Into Home:  Stairs With Railing


PT Steps Into Home:  3





Prior


Prior Level of Function


SCALE: Activities may be completed with or without assistive devices.





6-Indepedent-patient completes the activity by him/herself with no assistance 

from a helper.


5-Set-up or Clean-up Assistance-helper sets up or cleans up; patient completes 

activity. [place] assists only prior to or  


    following the activity.


4-Supervision or Touching Assistance-helper provides verbal cues and/or touchin

g/steadying and/or contact guard assistance as patient completes activity. 

Assistance may be provided   


    throughout the activity or intermittently.


3-Partial/Moderate Assistance-helper does LESS THAN HALF the effort. [place] 

lifts, holds or supports trunk or limbs, but provides less than half the effort.


2-Substantial/Maximal Assistance-helper does MORE THAN HALF the effort. [place] 

lifts or holds trunk or limbs and provides more than half the effort.


6-Qwtmygyee-ircerl does ALL the effort. Patient does none of the effort to 

complete the activity. Or, the assistance of 2 or more helpers is required for 

the patient to complete the  


    activity.


If activity was not attempted, code reason:


7-Patient Refused.


9-Not Applicable-not attempted and the patient did not perform the activity 

before the current illness, exacerbation or injury.


10-Not Attempted due to Environmental Limitations-(lack of equipment, weather 

restraints, etc.).


88-Not Attempted due to Medical Conditions or Safety Concerns.


Bed Mobility:  6


Transfers (B,C,W/C):  6


Gait:  6


Stairs:  6


Indoor Mobility (Ambulation):  Independent


Stairs:  Independent


Patient uses a SPC at home





PT Evaluation-Current


Subjective


Patient arrives via family transport.  Patient has discomfort from a blister on 

his bottom.  Will be co-treating with OT due to poor patient mobility, strength,

endurance, increased fatigue from travel, coordinate UE and LE with activity, 

safety and reduce risk of falls.





Pt/Family Goals


to be independent at  home





Objective


Patient Orientation:  Person, Place, Situation





ROM/Strength


ROM Lower Extremities


WNL


Strength Lower Extremities


LLE (hip flexion 3+/5, knee flexion 4+/5, knee extension 4+/5, dorsiflexion 

4+/5), RLE (hip flexion 3+/5, knee flexion 4+/5, knee extension 4+/5, juan

siflexion 4+/5)





Sensory


Vision:  Wears Glasses


Hearing:  Impaired


Sensation Right Lower Extremit:  Intact


Sensation Left Lower Extremity:  Intact





Transfers


Roll Left & Right (QC):  6


Sit to Lying (QC):  6


Lying to Sitting/Side of Bed(Q:  6


Sit to Stand (QC):  4


Chair/Bed-to-Chair Xfer(QC):  4


Toilet Transfer (QC):  4


Car Transfer (QC):  4


Patient performs rolling and supine <-> sit with independence, sit <-> stand and

transfers SBA, car transfer SBA.  Patient needs occasional cues for direction or

safety.





Gait


Does the Patient Walk?:  Yes


Mode of Locomotion:  Walk


Anticipated Mode of Locomotion:  Walk


Walk 10 feet (QC):  4


Walk 50 ft with 2 Turns(QC):  4


Walk 150 ft (QC):  4


Walking 10ft/uneven surface-QC:  4


Distance:  300'x2, 120'


Gait Assistive Device:  FWW


Comments/Gait Description


Patient can ambulate 300' with a rolling walker with SBA (including 50' with at 

least 2 turns of 90 degrees and 10' over an uneven surface), gait is fairly 

brisk and steady





Wheelchair Training


Does the Pt Use a Wheelchair?:  No


Wheel 50 ft with 2 turns (QC):  9


Wheel 150 ft (QC):  9





Stairs


#of Steps:  4


1 Step (curb) (QC):  4


4 Steps (QC):  4


12 Steps (QC):  88


Patient can go up and down 4 steps using 2 handrails with SBA





Balance


Sitting Static:  Normal


Sitting Dynamic:  Normal


Standing Static:  Good


 Standing Dynamic:  Fair


Picking up an Object (QC):  4 (SBA with reacher)


Special Test Comments


Patient scored 25/28 on the Tinetti Assessment Tool.





Treatment


Patient performs a walking activity where he had to find objects, scan, turn, 

reach.  He also performed some ADL's in his restroom.  PT performed bed mobility

and transfers, ambulation, stair training, gait training during douglas reaching 

activity, standing and safety during ADL's, OT performed some dressing, ADL's, 

hallway ambulating reaching activity, UE positioning and safety during activity.





Assessment/Needs


Patient in recliner post tx with nurse call, phone, tray, all needs met.  

Patient has impaired mobility, strength, endurance, balance.  Just needs SBA for

transfers and ambulation.


Rehab Potential:  Fair





PT Short Term Goals


Short Term Goals


Time Frame:  Aug 8, 2022


Roll Left & Right:  6


Sit to lyin


Lying to sitting on side of be:  6


Sit to stand:  5


Chair/bed-to-chair transfer:  5


Walk 10 feet:  5


Walk 50 feet with two turns:  5


Walk 150 feet:  5





PT Long Term Goals


Long Term Goals


PT Long Term Goals Time Frame:  Aug 22, 2022


Roll Left & Right (QC):  6


Sit to Lying (QC):  6


Lying-Sitting on Side/Bed(QC):  6


Sit to Stand (QC):  6


Chair/Bed-to-Chair Xfer(QC):  6


Toilet Transfer (QC):  6


Car Transfer (QC):  6


Does the Patient Walk:  Yes


Walk 10 feet (QC):  6


Walk 50ft with 2 Turns (QC):  6


Walk 150 ft (QC):  6


Walking 10ft on Uneven Surface:  6


1 Step (curb) (QC):  6


4 Steps (QC):  6


12 Steps (QC):  6


Picking up an Object (QC):  6


Wheel 50 feet with 2 turns (QC:  9


Wheel 150 feet:  9





PT Plan


Problem List


Problem List:  Activity Tolerance, Functional Strength, Safety, Balance, Gait, 

Transfer, ROM





Treatment/Plan


Treatment Plan:  Continue Plan of Care


Treatment Plan:  Education, Functional Activity Foreign, Functional Strength, 

Group Therapy, Gait, Safety, Therapeutic Exercise, Transfers


Treatment Duration:  Aug 22, 2022


Frequency:  At least 5 of 7 days/Wk (IRF)


Estimated Hrs Per Day:  1.5 hours per day


Patient and/or Family Agrees t:  Yes





Safety Risks/Education


Patient Education:  Gait Training, Transfer Techniques, Steps, Correct 

Positioning, Safety Issues


Teaching Recipient:  Patient


Teaching Methods:  Demonstration, Discussion


Response to Teaching:  Reinforcement Needed





Discharge Recommendations


Plan


Patient will perform bed mobility and transfer training, balance and endurance 

training, functional strengthening, stair training, gait training, and 

education, to improve functional mobility and independence at home.


Therapy Discharge Recommendati:  Home & Family, Post Acute PT





Time/GCodes


Time In:  1350


Time Out:  1525


Total Billed Treatment Time:  90


Total Billed Treatment


1 visit


EVM 10'


FA 80'





PT eval from [id number], OT eval from [id number], co-treating from [id number]











TOMY SAUL PT                 Aug 1, 2022 15:20

## 2022-08-01 NOTE — OCCUPATIONAL THERAPY EVAL
OT Evaluation-General/PLF


Medical Diagnosis


Admission Date


Aug 1, 2022 at 13:58


Medical Diagnosis:  rectal cancer, s/p Robot Assisted LAR w/diverting loop 

ileostomy


Onset Date:  2022





Therapy Diagnosis


Therapy Diagnosis:  reduced endurance, strength





Precautions


Precautions/Isolations:  Fall Prevention, Standard Precautions





Referral


Physician:  Cady


Referral Reason:  Evaluation/Treatment





Medical History


Pertinent Medical History:  HTN


Additional Medical History


rectal adenocarcinoma


Current History


22 Robot assisted low anterior resection, diverting loop ileostomy 


Pt reports living with his wife in a single story home. He was indep with adls 

(except socks/shoes) and his wife managed most of the iadls. (Pt able to make h

is own breakfast and lunch). He was using a cane at baseline or reports 

"furniture walking." Pt works ~35 hours/week at his [employer].


Reviewed History:  Yes





Social History


Home:  Single Level


Current Living Status:  Spouse


Entry Into Home:  Stairs With Railing


 Steps Into Home:  3





ADL-Prior Level of Function


SCALE: Activities may be completed with or without assistive devices.





6-Indepedent-patient completes the activity by him/herself with no assistance 

from a helper.


5-Set-up or Clean-up Assistance-helper sets up or cleans up; patient completes 

activity. [place] assists only prior to or  


    following the activity.


4-Supervision or Touching Assistance-helper provides verbal cues and/or 

touching/steadying and/or contact guard assistance as patient completes 

activity. Assistance may be provided   


    throughout the activity or intermittently.


3-Partial/Moderate Assistance-helper does LESS THAN HALF the effort. [place] 

lifts, holds or supports trunk or limbs, but provides less than half the effort.


2-Substantial/Maximal Assistance-helper does MORE THAN HALF the effort. [place] 

lifts or holds trunk or limbs and provides more than half the effort.


4-Eojuhekkx-guptnk does ALL the effort. Patient does none of the effort to 

complete the activity. Or, the assistance of 2 or more helpers is required for 

the patient to complete the  


    activity.


If activity was not attempted, code reason:


7-Patient Refused.


9-Not Applicable-not attempted and the patient did not perform the activity 

before the current illness, exacerbation or injury.


10-Not Attempted due to Environmental Limitations-(lack of equipment, weather 

restraints, etc.).


88-Not Attempted due to Medical Conditions or Safety Concerns.


Self Care:  Needed Some Help


Functional Cognition:  Independent


DME/Equipment:  Bath Bench (owns but was not using ), Tub/Shower


Drive Self:  No





OT Current Status


Subjective


Pt reports pain in tailbone from blister. No numerical value given. 


Co-treat with PT for part of treatment secondary to poor endurance, increased 

fatigue from travel, and poor mobility.





Appearance


Pt left sitting in recliner, all needs within reach at therapy departure.





Mental Status/Objective


Patient Orientation:  Person, Place, Situation


Attachments:  Colostomy/Ileostomy





Current


Glasses/Contacts:  Yes


Hearing Aids:  No


Dentures/Partials:  No


Hand Dominance:  Right


Upper Extremity ROM


WNL


Upper Extremity Strength


4/5 grossly


Fair 





ADL-Treatment


Eating (QC):  6


Oral Hygiene (QC):  6


Shower/Bathe Self (QC):  7


Upper Body Dressing (QC):  7


Lower Body Dressing (QC):  4


On/Off Footwear (QC):  1 (dependent with socks (baseline), SBA for shoes)


Toileting Hygiene (QC):  4 (SBA for urinating, anticipate education will be 

needed for care/management of colostomy)


Sit<>stand: SBA. Pt ambulated ~400 feet with SBA and use of walker. When using a

cane, mild unsteadiness and zigzagging observed. Pt requesting to defer use of 

cane until LE strength improves. Pt already dressed for the day,however was 

willing to demonstrate LB dressing for assessment purposes. He was able to 

thread BLE's into LB clothing without physical assistance. He reports that he 

uses a reacher when threading feet into underwear at baseline. He stood to 

manage over hips with supervision for safety. Pt refuses to demonstrate ability 

to don/doff socks as he verbalizes that he does not do it at home. Pt declines 

learning how to use AE as he reports that his wife will continue to perform task

for him post discharge. He independently stood and completed oral care, shaving,

and face washing. No unsteadiness observed. Pt does need intermittent cues for 

walker placement during adls and transfers.





Other Treatments


Pt walked through halls to retrieve cards placed at different heights. Focus on 

improving higher level balance, endurance, functional reaching, and safety 

needed for adls and functional transfers. Good safety observed when reaching out

of TYSON. Min verbal cues for improved body mechanics and to use legs vs back when

reaching below hip level. Appears to tolerate activity well.





Education


OT Patient Education:  Correct positioning, Energy conservation, Modified ADL 

techniques, Progress toward Goal/Update tx plan, Purpose of tx/functional 

activities, Safety issues, Use of adapted equipment


Teaching Recipient:  Patient


Teaching Methods:  Demonstration, Discussion


Response to Teaching:  Verbalize Understanding, Return Demonstration, 

Reinforcement Needed





OT Short Term Goals


Short Term Goals


Time Frame:  Aug 5, 2022


Eatin


Oral hygiene:  6


Toileting hygiene:  5


Shower/bathe self:  4


Upper body dressin


Lower body dressin


Putting on/taking off footwear:  5 (shoes only)





OT Long Term Goals


Long Term Goals


Time Frame:  Aug 11, 2022


Eating (QC):  6


Oral Hygiene (QC):  6


Toileting Hygiene (QC):  5 (set up for colostomy)


Shower/Bathe Self (QC):  5


Upper Body Dressing (QC):  6


Lower Body Dressing (QC):  6


On/Off Footwear (QC):  6 (shoes only)


1=Demonstrate adherence to instructed precautions during ADL tasks.


2=Patient will verbalize/demonstrate understanding of assistive 

devices/modifications for ADL.


3=Patient will improve strength/tolerance for activity to enable patient to 

perform ADL's.





OT Education/Plan


Problem List/Assessment


Assessment:  Decreased Activ Tolerance, Decreased Safety Aware, Decreased UE 

Strength, Impaired Funct Balance, Impaired I ADL's, Impaired Self-Care Skills





Discharge Recommendations


Plan/Recommendations:  Continue POC


Therapy Discharge Recommendati:  Home & Family, Post Acute OT (home health )





Treatment Plan/Plan of Care


Treatment,Training & Education:  Yes


Patient would benefit from OT for education, treatment and training to promote 

independence in ADL's, mobility, safety and/or upper extremity function for 

ADL's.


Plan of Care:  ADL Retraining, Caregiver Training, Cognitive Retraining, 

Concurrent Therapy, Functional Mobility, Group Exercise/Act as Ind, UE Funct 

Exercise/Act


Treatment Duration:  Aug 11, 2022


Frequency:  At least 5 of 7 days/Wk (IRF)


Estimated Hrs Per Day:  1.5 hours per day (75-90 min/day )


Agreement:  Yes


Rehab Potential:  Good





Time/GCodes


Start Time:  13:55


Stop Time:  15:25


Total Time Billed (hr/min):  90


Billed Treatment Time


1 visit


EVL (10 min)


ADL x3 (45 min)


FA x2 (35 min)











Libby Parker OT                 Aug 1, 2022 15:22

## 2022-08-01 NOTE — PM&R POST ADMISSION ASSESSMENT
PM&R HP


Date of Visit:  Aug 1, 2022


Time of Visit:  14:30


History of Present Illness


CC: Myopathy





HPI: This is an 82 yr old pt of Dr. Singletary with a history of rectal adenocarcinoma

status post robotic assisted low anterior resection with diverting loop 

ileostomy. Pt presented for severe weakness, variable bp with orthostasis, and 

acute blood loss anemia. He previously was independent without any assisted 

devices. He does require help donning and doffing socks, but now he is minimum 

to moderate assist bed mobility only taking 2-3 steps at a time. He lives with 

his spouse, so we will aggressively initiate therapy in order to return back to 

independent living.





Past Medical-Social-Family Hx


Past Med/Social Hx:  Reviewed Nursing Past Med/Soc Hx, Reviewed and Corrections 

made


Patient Social History


Marrital Status:  


Employed/Student:  retired


Alcohol Use:  Denies Use


Smoking Status:  Never a Smoker


Recent Hopitalizations:  No





Seasonal Allergies


Seasonal Allergies:  No





Past Medical History


Surgeries:  Abdominal, Appendectomy, Orthopedic, Tonsillectomy


Cardiac:  Hypertension


Genitourinary:  Benign Prostatic Hyperpl


History of Blood Disorders:  No





Prior Level of Function


Bed Mobility:  6


Transfers:  6


Gait:  6


Stairs:  6


Indoor Mobility (Ambulation):  Independent


Stairs:  Independent


Self Care:  Needed Some Help


Functional Cognition:  Independent


Drive Self:  No





Current Level of Fuctioning


Roll Left to Right:  6


Sit to Lyin


Lying to Sitting/Side of Bed:  6


Sit to Stand:  4


Chair/Bed-to-Chair Xfer:  4


Car Transfer:  4


Does the Patient Walk:  Yes


Mode of Locomotion:  Walk


Anticipated Mode of Locomotion:  Walk


Walk 10 feet:  4


Walk 50 ft with 2 Turns:  4


Walk 150 ft:  4


Walking 10ft on uneven surface:  4


Gait Assistive Device:  FWW


Does the Pt Use a Wheelchair:  No


Wheel 50 ft with 2 turns:  9


Wheel 150 ft:  9


#of Steps:  4


1 Step (curb):  4


4 Steps:  4


12 Steps:  88


Picking up an Object:  4 (SBA with reacher)


Eatin


Oral Hygiene:  6


Shower/Bathe Self:  7


Upper Body Dressin


Lower Body Dressin


On/Off Footwear:  1 (dependent with socks (baseline), SBA for shoes)


Toileting Hygiene:  4 (SBA for urinating, anticipate education will be needed 

for care/management of colostomy)





PM&R Allergy/Meds/Data Review


Allergies


Coded Allergies:  


     albuterol (Verified  Allergy, Unknown, Tachycardia , 22)


     aspartame (Verified  Allergy, Unknown, Causes extreme brain fog per pt, 

22)


     morphine (Verified  Allergy, Unknown, 22)


     sucralose (Verified  Allergy, Unknown, Splenda- heart racing, 22)





Home Medications


Scheduled


Ascorbate Calcium (Vitamin C), 500 MG PO DAILY, (Reported)


Aspirin (Aspirin EC), 81 MG PO DAILY, (Reported)


Bacitracin (Bacitracin), 1 APPLIC TP BID, (Reported)


Cetirizine HCl (Cetirizine HCl), 10 MG PO DAILY, (Reported)


Cholecalciferol (Vitamin D3) (Vitamin D3), 25 MCG PO DAILY, (Reported)


Diphenoxylate HCl/Atropine (Lomotil 2.5-0.025 mg Tablet), 1 EACH PO BID, 

(Reported)


Ferrous Gluconate (Ferrous Gluconate), 162 MG PO DAILY, (Reported)


Gluc/Sergio-MSM#1/C/Gordon/Eduin/Bor (Osteo Bi-Flex Caplet), 1 EACH PO DAILY, (Report

ed)


Loperamide HCl (Imodium A-D), 4 MG PO QID, (Reported)


Metoprolol Succinate (Metoprolol Succinate), 100 MG PO DAILY, (Reported)


Montelukast Sodium (Montelukast Sodium), 10 MG PO DAILY, (Reported)


Multivitamin (Multivitamin), 1 EACH PO DAILY, (Reported)


Omega 3 Polyunsat Fatty Acids (Fish Oil 1,000 mg Capsule), 1,000 MG PO BID, 

(Reported)


Pantoprazole Sodium (Pantoprazole Sodium), 40 MG PO DAILY, (Reported)


Potassium Citrate (Potassium), 99 MG PO HS, (Reported)


Resveratrol (Resveratrol), 250 MG PO DAILY, (Reported)


Rosuvastatin Calcium (Rosuvastatin Calcium), 20 MG PO DAILY, (Reported)


Tamsulosin HCl (Flomax), 0.8 MG PO DAILY, (Reported)


Ubidecarenone (Coq-10), 100 MG PO DAILY, (Reported)


Vit A/Vit C/Vit E/Zinc/Copper (Icaps Areds Softgel), 1 EACH PO DAILY, (Reported)


[Bjorn Treeze], 1-2 EA PO HS, (Reported)


[Cramp Defense Magnes], 3 EA PO HS, (Reported)


[Magic Mouthwash], 10 ML TIDAC, (Reported)





Scheduled PRN


Acetaminophen (Tylenol), 975 MG PO Q8H PRN for PAIN-MILD (1-4), (Reported)


Betamethasone Dipropionate (Betamethasone Dipropionate), 1 APPLIC TP DAILY PRN 

for SKIN CONDITIONS, (Reported)


Tramadol HCl (Tramadol HCl), 50 MG PO Q8H PRN for PAIN-MODERATE (5-7), 

(Reported)





Discontinued Medications


Albuterol Sulfate (Albuterol Sulfate), 2.5 MG INH Q4H PRN for COUGH


   Discontinued Reason: Referral/FU Appt-Addtl


Azithromycin (Azithromycin), 250 MG PO UD


   Discontinued Reason: Referral/FU Appt-Addtl





Current Medications


Current Medications


Reviewed





Review of Systems


Constitutional:  see HPI, malaise, weakness


EENTM:  no symptoms reported


Respiratory:  no symptoms reported


Cardiovascular:  no symptoms reported


Gastrointestinal:  diarrhea


Genitourinary:  no symptoms reported


Musculoskeletal:  back pain, joint pain


Psychiatric/Neurological:  No Symptoms Reported


All Other Systems Reviewed


Negative Unless Noted:  Yes





Physical Exam


Physical Exam


Vital Signs





Vital Signs - First Documented








 22





 15:00


 


Temp 36.3


 


Pulse 76


 


Resp 18


 


B/P (MAP) 130/60 (83)


 


Pulse Ox 96


 


O2 Delivery Room Air





Capillary Refill :


Height, Weight, BMI


Height: '"


Weight: lbs. oz. kg; 33.81 BMI


Method:


General Appearance:  No Apparent Distress, WD/WN, Chronically ill


Eyes:  Bilateral Eye Normal Inspection, Bilateral Eye PERRL


HEENT:  PERRL/EOMI, Normal ENT Inspection, Pharynx Normal


Neck:  Full Range of Motion, Normal Inspection, Non Tender, Supple, Carotid 

Bruit


Respiratory:  Chest Non Tender, Lungs Clear, Normal Breath Sounds, No Accessory 

Muscle Use, No Respiratory Distress


Cardiovascular:  Regular Rate, Rhythm, No Edema, No Gallop, No JVD, No Murmur, 

Normal Peripheral Pulses


Gastrointestinal:  Normal Bowel Sounds, No Organomegaly, No Pulsatile Mass, Non 

Tender, Soft


Back:  Normal Inspection, No CVA Tenderness, No Vertebral Tenderness


Extremity:  Normal Capillary Refill, Normal Inspection, Normal Range of Motion, 

Non Tender, No Calf Tenderness, No Pedal Edema


Neurologic/Psychiatric:  Alert, Oriented x3, CNs II-XII Norm as Tested, Abnormal

Gait, Depressed Affect, Motor Weakness (generalized)


Skin:  Normal Color, Warm/Dry


Lymphatic:  No Adenopathy





PM&R Medical Assessment & Plan


REHAB/MEDICAL ASSESSMENT AND PLAN:





REHAB IMPAIRMENT GROUP: 


Myopathy





ETIOLOGIC DIAGNOSIS: 


Myopathy





The comorbidities that impact the patients function and/or functional outcome 

by: advanced age, severe diarrhea, weakness, fall risk





REHAB PLAN:


The patient is being admitted to our comprehensive inpatient rehabilitation 

facility and can tolerate the intensity of service consisting of at least:


      180 minutes of therapy a day, 5 out of 7 days a week 


    


Rehab treatment will consist of:  PT OT will focus on regaining function in 

order to regain independence with use of assistive devices in order to return 

back to independence





The patient/family has a good understanding of our discharge process and will 

benefit from an interdisciplinary inpatient rehabilitation program. The patient 

has potential to make improvement and is in need of at least two of the 

following multidisciplinary therapies including but not limited to physical, 

occupational, speech, and prosthetics and orthotics.  Additionally the patient 

will need services from respiratory, nutritional services, wound care, 

psychology, etc. (Customize this to each patient). Given the patients complex 

condition and risk of further medical complications, rehabilitation services 

cannot be safely or effectively provided at a lower level of care such as a 

skilled nursing facility.





BARRIERS TO DISCHARGE:


Weakness with cancer





ESTIMATED LOS:


7 days





DISPOSITION:


Home





RELEVANT CHANGES SINCE PREADMISSION SCREENING: 


I have compared the patients medical and functional status at the time of the 

preadmission screening and there are: 


      no changes





PROGNOSIS:


Good





REHABILITATION GOALS:  


1.PT OT will focus on regaining function in order to regain independence with 

use of assistive devices in order to return back to independence








All the above goals were reviewed with the patient and he/she is in agreement.


By signing this document, I acknowledge that I have personally performed a full 

physical examination on this patient within 24 hours of admission to this 

inpatient rehabilitation facility and have determined the patient to be able to 

tolerate the above course of treatment at an intensive level for a reasonable 

period of time. I will be completing a detailed individualized Plan of Care for 

this patient by day #4 of the patients stay based upon the Preadmission Screen,

the Post-Admission Evaluation, and the therapy evaluations.


Admission Dx/Comorbidities:  


(1) Colon cancer


ICD Codes:  C18.9 - Malignant neoplasm of colon, unspecified


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


ICD Codes:  U09.9 - Post COVID-19 condition, unspecified


(3) Weight loss


ICD Codes:  R63.4 - Abnormal weight loss


(4) Advanced age


ICD Codes:  R54 - Age-related physical debility


(5) Weakness


Status:  Acute


ICD Codes:  R53.1 - Weakness





Assessment/Plan


Assessment and Plan


Assess & Plan/Chief Complaint


Assessment:


Myopathy


Colon cancer resection


Long hauler COVID symptoms


Weight loss 45 pounds


BPH








Plan:


Monitor closely


Home meds


IRF protocol











LUCIAN SAEED DO                 Aug 1, 2022 22:11

## 2022-08-02 VITALS — DIASTOLIC BLOOD PRESSURE: 63 MMHG | SYSTOLIC BLOOD PRESSURE: 133 MMHG

## 2022-08-02 VITALS — DIASTOLIC BLOOD PRESSURE: 58 MMHG | SYSTOLIC BLOOD PRESSURE: 119 MMHG

## 2022-08-02 LAB
ALBUMIN SERPL-MCNC: 3 GM/DL (ref 3.2–4.5)
ALP SERPL-CCNC: 52 U/L (ref 40–136)
ALT SERPL-CCNC: 30 U/L (ref 0–55)
BASOPHILS # BLD AUTO: 0 10^3/UL (ref 0–0.1)
BASOPHILS NFR BLD AUTO: 1 % (ref 0–10)
BILIRUB SERPL-MCNC: 0.5 MG/DL (ref 0.1–1)
BUN/CREAT SERPL: 16
CALCIUM SERPL-MCNC: 9 MG/DL (ref 8.5–10.1)
CHLORIDE SERPL-SCNC: 104 MMOL/L (ref 98–107)
CO2 SERPL-SCNC: 24 MMOL/L (ref 21–32)
CREAT SERPL-MCNC: 0.8 MG/DL (ref 0.6–1.3)
EOSINOPHIL # BLD AUTO: 0.2 10^3/UL (ref 0–0.3)
EOSINOPHIL NFR BLD AUTO: 3 % (ref 0–10)
GFR SERPLBLD BASED ON 1.73 SQ M-ARVRAT: 87 ML/MIN
GLUCOSE SERPL-MCNC: 94 MG/DL (ref 70–105)
HCT VFR BLD CALC: 33 % (ref 40–54)
HGB BLD-MCNC: 11 G/DL (ref 13.3–17.7)
LYMPHOCYTES # BLD AUTO: 0.9 10^3/UL (ref 1–4)
LYMPHOCYTES NFR BLD AUTO: 14 % (ref 12–44)
MANUAL DIFFERENTIAL PERFORMED BLD QL: NO
MCH RBC QN AUTO: 32 PG (ref 25–34)
MCHC RBC AUTO-ENTMCNC: 33 G/DL (ref 32–36)
MCV RBC AUTO: 97 FL (ref 80–99)
MONOCYTES # BLD AUTO: 1.1 10^3/UL (ref 0–1)
MONOCYTES NFR BLD AUTO: 17 % (ref 0–12)
NEUTROPHILS # BLD AUTO: 4.2 10^3/UL (ref 1.8–7.8)
NEUTROPHILS NFR BLD AUTO: 65 % (ref 42–75)
PLATELET # BLD: 293 10^3/UL (ref 130–400)
PMV BLD AUTO: 9.4 FL (ref 9–12.2)
POTASSIUM SERPL-SCNC: 3.7 MMOL/L (ref 3.6–5)
PROT SERPL-MCNC: 6 GM/DL (ref 6.4–8.2)
SODIUM SERPL-SCNC: 138 MMOL/L (ref 135–145)
WBC # BLD AUTO: 6.4 10^3/UL (ref 4.3–11)

## 2022-08-02 RX ADMIN — ROSUVASTATIN CALCIUM SCH MG: 20 TABLET, FILM COATED ORAL at 08:19

## 2022-08-02 RX ADMIN — OMEGA-3 FATTY ACIDS CAP 1000 MG SCH MG: 1000 CAP at 08:18

## 2022-08-02 RX ADMIN — LOPERAMIDE HYDROCHLORIDE SCH MG: 2 CAPSULE ORAL at 12:16

## 2022-08-02 RX ADMIN — DOCUSATE SODIUM SCH MG: 100 CAPSULE ORAL at 08:21

## 2022-08-02 RX ADMIN — LOPERAMIDE HYDROCHLORIDE SCH MG: 2 CAPSULE ORAL at 08:20

## 2022-08-02 RX ADMIN — LOPERAMIDE HYDROCHLORIDE SCH MG: 2 CAPSULE ORAL at 17:33

## 2022-08-02 RX ADMIN — DIPHENOXYLATE HYDROCHLORIDE AND ATROPINE SULFATE SCH EA: 2.5; .025 TABLET ORAL at 20:18

## 2022-08-02 RX ADMIN — POLYETHYLENE GLYCOL (3350) SCH GM: 17 POWDER, FOR SOLUTION ORAL at 21:00

## 2022-08-02 RX ADMIN — Medication SCH MCG: at 08:19

## 2022-08-02 RX ADMIN — LOPERAMIDE HYDROCHLORIDE SCH MG: 2 CAPSULE ORAL at 20:18

## 2022-08-02 RX ADMIN — LORATADINE SCH MG: 10 TABLET ORAL at 08:19

## 2022-08-02 RX ADMIN — BACITRACIN SCH EACH: 500 OINTMENT TOPICAL at 08:20

## 2022-08-02 RX ADMIN — OMEGA-3 FATTY ACIDS CAP 1000 MG SCH MG: 1000 CAP at 20:18

## 2022-08-02 RX ADMIN — PANTOPRAZOLE SODIUM SCH MG: 40 TABLET, DELAYED RELEASE ORAL at 08:19

## 2022-08-02 RX ADMIN — METOPROLOL SUCCINATE SCH MG: 100 TABLET, EXTENDED RELEASE ORAL at 08:19

## 2022-08-02 RX ADMIN — BACITRACIN SCH EACH: 500 OINTMENT TOPICAL at 20:18

## 2022-08-02 RX ADMIN — Medication SCH EA: at 06:23

## 2022-08-02 RX ADMIN — Medication SCH CAP: at 08:31

## 2022-08-02 RX ADMIN — OXYCODONE HYDROCHLORIDE AND ACETAMINOPHEN SCH MG: 500 TABLET ORAL at 08:19

## 2022-08-02 RX ADMIN — POLYETHYLENE GLYCOL (3350) SCH GM: 17 POWDER, FOR SOLUTION ORAL at 08:20

## 2022-08-02 RX ADMIN — LIDOCAINE SCH EA: 50 PATCH CUTANEOUS at 08:25

## 2022-08-02 RX ADMIN — MONTELUKAST SCH MG: 10 TABLET, FILM COATED ORAL at 08:19

## 2022-08-02 RX ADMIN — DOCUSATE SODIUM AND SENNOSIDES SCH EA: 8.6; 5 TABLET, FILM COATED ORAL at 08:20

## 2022-08-02 RX ADMIN — DIPHENOXYLATE HYDROCHLORIDE AND ATROPINE SULFATE SCH EA: 2.5; .025 TABLET ORAL at 08:19

## 2022-08-02 RX ADMIN — DOCUSATE SODIUM SCH MG: 100 CAPSULE ORAL at 21:00

## 2022-08-02 RX ADMIN — DOCUSATE SODIUM AND SENNOSIDES SCH EA: 8.6; 5 TABLET, FILM COATED ORAL at 21:00

## 2022-08-02 RX ADMIN — FERROUS SULFATE TAB 325 MG (65 MG ELEMENTAL FE) SCH MG: 325 (65 FE) TAB at 08:24

## 2022-08-02 RX ADMIN — MONTELUKAST SCH MG: 10 TABLET, FILM COATED ORAL at 20:18

## 2022-08-02 RX ADMIN — ASPIRIN SCH MG: 81 TABLET ORAL at 08:18

## 2022-08-02 NOTE — PROGRESS NOTE
RONALD YOUNGBLOOD 8/2/22 1054:


Progress Note


CC: Myopathy





HPI: Ankur Coles is an 83y/o M who presented to inpatient rehab yesterday after

being discharged from  due to rectal adenocarcinoma s/p post robotic assisted 

low anterior resection w/ diverting loop ileostomy.  Pt presented for severe 

weakness, variable bp with orthostasis, and acute blood loss anemia. He 

previously was independent without any assisted devices. He does require help 

donning and doffing socks, but now he is minimum to moderate assist bed mobility

only taking 2-3 steps at a time. He lives with his spouse, so we will 

aggressively initiate therapy in order to return back to independent living.





PMH: 


   Rectal adenocarcinoma


   BPH


   HTN


   HLD


   GERD





PSH:


   Low anterior resection w/ diverting loop ileostomy


   Appendectomy


   Tonsillectomy 


   Rt ankle repair


   Kidney stone retrieval





Allergies:


   albuterol


   aspartame


   morphine


   sucralose





Meds:


tylenol 325mg Q8h PRN, flomax 0.4mg QD, claritin 10mg QD, crestor 20mg QD, pro

tonix 40mg QD, singulair 10mg QD, metoprolol succinate 100mg QD, loperamide 40mg

QUD, ferrous gluconate 162mg QD, Vit D3 1000units QD, lomotil 1 tab BID, aspirin

81mg QD, bacitracin topical BID, Vit C 500mg QD





SH: , retired, never a smoker, no alcohol use





ROS:


   Gen: no fevers, chills


   HEENT: no vision changes, eye pain


   Cardiac: no chest pain or palpitations


   Lungs: no SOB or cough


   Abdomen: no N/V, mild abdominal pain


   : no dysuria, hematuria


   MSK: no muscle pains or weakness


   N: no numbness or tingling in extremities 





PE: 


   gen: appears appropriate age, well groomed 


   HEENT: PERRL/EOMI


   Cardiac: RRR, no murmur


   Lungs: CTAB, regular rate


   GI: colostomy appears viable w/ slight protrusion, abdomen soft


   LE: peripheral pulses intact, no cyanosis


   N: A&Ox3, normal mood





Labs:


   Hgb:11


   Hct:33





A&P:


Myopathy


   -deconditioning likely related to recent surgery


   -currently doing PT and OT


   -continue PT/OT to try and rebuild strength





Aectal adenocarcinoma s/p post robotic assisted low anterior resection w/ 

diverting loop ileostomy


   -done at  where he was discharged from on 8/1


   -continue to monitor colostomy for output and any changes to appearance





Dysphagia


   -assessment done today, according to report he did not show any signs of 

impairment


   -possibly due to mouthwash w/ lidocaine that he was using, have D/C it


   -continue to monitor





HTN


   -continue metoprolol





BPH


   -continue flomax





HLD


   -continue crestor





Anemia


   -Hgb of 11 today, continue ferrous gluconate


   -will continue to monitor





Seasonal allergies


   -continue claritin and singulair





GERD


   -continue protonix





DVT prophylaxis


   -SCDs





















































MONA SAEED DO 8/2/22 2049:


Supervisory-Addendum Brief


Verification & Attestation


Participated in pt care:  history, MDM, physical


Personally performed:  exam, history, MDM, supervision of care


Care discussed with:  Medical Student


Procedures:  n/a


Results interpretation:  Verified all documentation


Verification and Attestation of Medical Student E/M Service





A medical student performed and documented this service in my presence. I 

reviewed and verified all information documented by the medical student and made

modifications to such information, when appropriate. I personally performed the 

physical exam and medical decision making. 





 Mona Saeed, Aug 2, 2022,20:49











RONALD YOUNGBLOOD                    Aug 2, 2022 10:54


MONA SAEED DO                 Aug 2, 2022 20:49

## 2022-08-02 NOTE — PHYSICAL THERAPY DAILY NOTE
PT Daily Note-Current


Subjective


Pt siting in recliner upon arrival.  Pt agrees to PT.  Nurse & Med Student 

checked on pt during tx, morning meds given.





Mental Status


Patient Orientation:  Person, Place, Time, Situation





Transfers


SCALE: Activities may be completed with or without assistive devices.





6-Indepedent-patient completes the activity by him/herself with no assistance 

from a helper.


5-Set-up or Clean-up Assistance-helper sets up or cleans up; patient completes 

activity. [place] assists only prior to or  


    following the activity.


4-Supervision or Touching Assistance-helper provides verbal cues and/or 

touching/steadying and/or contact guard assistance as patient completes 

activity. Assistance may be provided   


    throughout the activity or intermittently.


3-Partial/Moderate Assistance-helper does LESS THAN HALF the effort. [place] 

lifts, holds or supports trunk or limbs, but provides less than half the effort.


2-Substantial/Maximal Assistance-helper does MORE THAN HALF the effort. [place] 

lifts or holds trunk or limbs and provides more than half the effort.


7-Ocoucllha-fumatr does ALL the effort. Patient does none of the effort to 

complete the activity. Or, the assistance of 2 or more helpers is required for 

the patient to complete the  


    activity.


If activity was not attempted, code reason:


7-Patient Refused.


9-Not Applicable-not attempted and the patient did not perform the activity 

before the current illness, exacerbation or injury.


10-Not Attempted due to Environmental Limitations-(lack of equipment, weather 

restraints, etc.).


88-Not Attempted due to Medical Conditions or Safety Concerns.





Weight Bearing


Full Weight Bearing


Full Weight Bearing





Exercises


Supine Ex:  Ankle pumps, Quad Set, Glut sets, Heel Slides, Hip abd/add


Supine Reps:  15


Seated Therapy Exercises:  Ankle pumps, Long arc quads, Hip flexion, Hip 

abd/add, Glut set


Seated Reps:  15





Treatments


Nurse arrives to give morning meds to begin tx.  PTA gives explanation of ARU 

expectations, progress vs eval. & possible AE needed.  Med Student checks on pt 

during tx. PTA issues and reviews written HEP for Supine & Seated Ex with pt & 

Sp.  Pt sitting in recliner at end of tx with all needs met, call light in hand.





Assessment


Current Status:  Fair Progress


Pt needs redirection at times to stay on task.





PT Short Term Goals


Short Term Goals


Time Frame:  Aug 8, 2022


Roll Left & Right:  6


Sit to lyin


Lying to sitting on side of be:  6


Sit to stand:  5


Chair/bed-to-chair transfer:  5


Walk 10 feet:  5


Walk 50 feet with two turns:  5


Walk 150 feet:  5





PT Long Term Goals


Long Term Goals


PT Long Term Goals Time Frame:  Aug 22, 2022


Roll Left & Right (QC):  6


Sit to Lying (QC):  6


Lying-Sitting on Side/Bed(QC):  6


Sit to Stand (QC):  6


Chair/Bed-to-Chair Xfer(QC):  6


Toilet Transfer (QC):  6


Car Transfer (QC):  6


Does the Patient Walk:  Yes


Walk 10 feet (QC):  6


Walk 50ft with 2 Turns (QC):  6


Walk 150 ft (QC):  6


Walking 10ft on Uneven Surface:  6


1 Step (curb) (QC):  6


4 Steps (QC):  6


12 Steps (QC):  6


Picking up an Object (QC):  6


Wheel 50 feet with 2 turns (QC:  9


Wheel 150 feet:  9





PT Plan


Problem List


Problem List:  Activity Tolerance, Gait





Treatment/Plan


Treatment Plan:  Continue Plan of Care


Treatment Plan:  Education, Functional Activity Foreign, Functional Strength, 

Group Therapy, Gait, Safety, Therapeutic Exercise, Transfers


Treatment Duration:  Aug 22, 2022


Frequency:  At least 5 of 7 days/Wk (IRF)


Estimated Hrs Per Day:  1.5 hours per day


Patient and/or Family Agrees t:  Yes





Safety Risks/Education


Patient Education:  Gait Training, Transfer Techniques, Issued Written HEP, 

Correct Positioning, Safety Issues


Teaching Recipient:  Patient


Teaching Methods:  Demonstration, Discussion


Response to Teaching:  Verbalize Understanding, Return Demonstration





Time/GCodes


Time In:  800


Time Out:  900


Total Billed Treatment Time:  60


Total Billed Treatment


1, FA x2 (30m) & Ex x2 (30m)











JOHN LOPES PTA               Aug 2, 2022 09:07

## 2022-08-02 NOTE — ST DYSPHAGIA EVALUATION
Speech Evaluation-General


Medical Diagnosis


Rectal Cancer, s/p Robot Assisted LAR w/Diverting Loop Ileostomy


Onset Date:  Jul 19, 2022





Therapy Diagnosis


Therapy Diagnosis:  Intermittent Odynophagia





Precautions


Precautions:  Fall, Aspiration


Precautions/Isolations:  Aspiration, Fall Prevention, Standard Precautions





Referral


Referring Physician:  Dr. Lazar


Reason for Referral:  Evaluation/Treatment





Medical History


Pertinent Medical History:  HTN


Current History


The patient is an 82 year old male with a past medical history significant for 

HTN, who presented to inpatient rehabilitation following a diagnosis of rectal 

adenocarcinoma status post robotic assisted low anterior resection with 

diverting loop ileostomy.


Reviewed History:  Yes





Social History


Current Living Status:  Spouse





Speech PLF/Current-Dysphagia


Prior Level of Function


The patient reported consuming a regular diet consistency with thin liquids 

prior to admission. The patient reported sparse dentition, therefore, he 

completes slightly prolonged mastication with dry solid consistencies.





Subjective


The patient was seated upright in his recliner, awake and alert upon entrance to

his room by the clinician. The patient's wife remains present at bedside. The 

patient greeted the clinician appropriately and was agreeable to participation 

in the clinical bedside swallowing evaluation.





Per patient, following intubation he has experienced a "scratchy" sensation in 

his throat, which he localizes to the laryngeal region. Initially, the patient 

stated he was treated for "some fungus" with Nyastatin. As the odynophagia 

continued, the outside facility provided the patient with lidocaine prior to 

eating and drinking. Per patient, he did not care for the lidocaine as he felt 

like he "could not feel what he was doing." The patient denied s/s of suspected 

aspiration with his current P.O. intake.





The patient stated, "I ate breakfast fine. After breakfast they gave me the 

magic mouthwash and I don't like it. I can't feel anything again."





Cognitive Status


Patient Orientation:  Person, Place, Time, Situation





Oral Motor Skills


Current Food Consistancy:  Regular, Thin Liquids


Ability to Follow Directions:  Good


Oral Expression Ability:  No Impairment





Voice


Voice Phonatory-Based Quality:  Normal


Voice Pitch:  Normal


Voice Loudness:  Normal





Face


Facial Symmetry:  Symmetrical





Oral-Facial Assessment


Oral-Facial Dentition:  Normal


Labial Seal Description:  Normal


Smile:  Normal


Puff Cheeks:  Normal


Lingual Protrusion:  Normal


Lingual ROM:  Normal


Lingual Strength:  Normal


Volitional Dry Swallow:  Yes


Voluntary Cough:  Yes


Can Clear Throat Volitionally:  Yes


Productive Cough:  Yes


Productive Throat Clear:  Yes





Dysphagia Evaluation


Consistencies Presented:  Regular, Thin Liquid


The patient did not display any impairments to the oral phase of the swallow 

function.


The patient did not display any pharyngeal impairments to the swallow function. 

The patient denied odynophagia at this time.


Dietary Recommendations:  Regular


Liquid Recommendations:  Thin





Recommendations:


- Continue a regular diet consistency with thin liquids, as tolerated.


- Fully upright and alert for P.O. intake.


- Small, single bites and sips.


- Do not provide lidocaine prior to P.O. intake as the lidocaine will negatively

impact necessary oral and pharyngeal sensation to trigger a safe swallowing 

response. If needed, provide lidocaine following P.O. intake for comfort.


- Monitor for s/s of suspected aspiration with P.O. intake. If demonstrated, 

contact speech pathology.





The clinician discussed the results with the patient and his significant other. 

Additionally, the clinician and RN discussed the patient's plan of care.


Dysphagia Evaluation Summary


The patient demonstrated an intact oropharyngeal swallow function. Intermittent 

odynophagia remains present.





Speech-Plan


Treatment Plan


Speech Therapy Treatment Plan:  Discontinue ST


Treatment Duration:  Aug 2, 2022


Frequency:  1 time per week


Estimated Hrs Per Day:  .5 hour per day


Rehab Potential:  Good





Safety Risks/Education


Teaching Recipient:  Patient, Significant Other


Teaching Methods:  Discussion


Response to Teaching:  Verbalize Understanding


Education Topics Provided:  


Results, Swallowing Strategies, S/s of Suspected Aspiration, Plan of Care





Time


Speech Therapy Time In:  09:16


Speech Therapy Time Out:  09:32


Total Billed Time:  16


Billed Treatment Time


1, JERAMY COLE [facility] ST                Aug 2, 2022 10:35

## 2022-08-02 NOTE — PM&R PROGRESS NOTE
Subjective


HPI/CC On Admission


Date Seen by Provider:  Aug 2, 2022


Time Seen by Provider:  10:00


Subjective/Events-last exam


8/2/2022:


Doing better


Ileostomy functioning well


Gaining strength


Slept better last night


Labs reviewed


No pain





Review of Systems


General:  Fatigue, Malaise





Objective


Exam


Vital Signs





Vital Signs








  Date Time  Temp Pulse Resp B/P (MAP) Pulse Ox O2 Delivery O2 Flow Rate FiO2


 


8/2/22 09:00      Room Air  


 


8/2/22 07:31 36.3 80 16 119/58 (78) 96   





Capillary Refill :


General Appearance:  No Apparent Distress, WD/WN, Chronically ill


HEENT:  PERRL/EOMI, Normal ENT Inspection, Pharynx Normal


Neck:  Full Range of Motion, Normal Inspection, Non Tender, Supple, Carotid 

Bruit


Respiratory:  Chest Non Tender, Lungs Clear, Normal Breath Sounds, No Accessory 

Muscle Use, No Respiratory Distress


Cardiovascular:  Regular Rate, Rhythm, No Edema, No Gallop, No JVD, No Murmur, 

Normal Peripheral Pulses


Gastrointestinal:  Normal Bowel Sounds, No Organomegaly, No Pulsatile Mass, Non 

Tender, Soft


Back:  Normal Inspection, No CVA Tenderness, No Vertebral Tenderness


Extremity:  Normal Capillary Refill, Normal Inspection, Normal Range of Motion, 

Non Tender, No Calf Tenderness, No Pedal Edema


Neurologic/Psychiatric:  Alert, Oriented x3, CNs II-XII Norm as Tested, Abnormal

Gait, Depressed Affect, Motor Weakness (generalized)


Skin:  Normal Color, Warm/Dry


Lymphatic:  No Adenopathy





Results/Procedures


Lab


Laboratory Tests


8/2/22 05:28








Patient resulted labs reviewed.





FIM


Transfers


Therapy Code Descriptions/Definitions 





Functional [place] Measure:


0=Not Assessed/NA        4=Minimal Assistance


1=Total Assistance        5=Supervision or Setup


2=Maximal Assistance  6=Modified [place]


3=Moderate Assistance 7=Complete IndependenceSCALE: Activities may be completed 

with or without assistive devices.





6-Indepedent-patient completes the activity by him/herself with no assistance 

from a helper.


5-Set-up or Clean-up Assistance-helper sets up or cleans up; patient completes 

activity. [place] assists only prior to or  


    following the activity.


4-Supervision or Touching Assistance-helper provides verbal cues and/or 

touching/steadying and/or contact guard assistance as patient completes 

activity. Assistance may be provided   


    throughout the activity or intermittently.


3-Partial/Moderate Assistance-helper does LESS THAN HALF the effort. [place] 

lifts, holds or supports trunk or limbs, but provides less than half the effort.


2-Substantial/Maximal Assistance-helper does MORE THAN HALF the effort. [place] 

lifts or holds trunk or limbs and provides more than half the effort.


1-[id number] does ALL the effort. Patient does none of the effort to 

complete the activity. Or, the assistance of 2 or more helpers is required for 

the patient to complete the  


    activity.


If activity was not attempted, code reason:


7-Patient Refused.


9-Not Applicable-not attempted and the patient did not perform the activity 

before the current illness, exacerbation or injury.


10-Not Attempted due to Environmental Limitations-(lack of equipment, weather 

restraints, etc.).


88-Not Attempted due to Medical Conditions or Safety Concerns.


Roll Left to Right (QC):  6


Sit to Lying (QC):  6


Sit to Stand (QC):  5


Chair/Bed-to-Chair Xfer(QC):  4


Car Transfer (QC):  4





Gait Training


Does the Patient Walk?:  Yes


Distance:  250', 150'


Walk 10 feet (QC):  5


Walk 50 ft with 2 Turns(QC):  5


Walk 150 ft (QC):  5


Walking 10ft/uneven surface-QC:  4


Gait Persons Needed:  1


Gait Assistive Device:  FWW





Wheelchair Training


Does the Pt Use a Wheelchair?:  No


Wheel 50 ft with 2 turns (QC):  9


Wheel 150 ft (QC):  9





Stair Training


#of Steps:  4


1 Step (curb) (QC):  4


4 Steps (QC):  4


12 Steps (QC):  88





Balance


Picking up an Object (QC):  4 (SBA with reacher)





ADL-Treatment


Eating (QC):  6 (Per clinical judgment.)


Oral Hygiene (QC):  6


Shower/Bathe Self (QC):  4


Upper Body Dressing (QC):  6


Lower Body Dressing (QC):  4


On/Off Footwear (QC):  6 (shoes only)


Toileting Hygiene (QC):  2


Toilet Transfer (QC):  6





Assessment/Plan


Assessment and Plan


Assess & Plan/Chief Complaint


Assessment:


Myopathy


Rectal adenocarcinoma resection with ileostomy


Long hauler COVID symptoms


Weight loss 45 pounds


BPH


Anemia








Plan:


Monitor closely


Home meds


IRF protocol





8/2/2022:


Monitor fluid intake


Lomotil and Imodium





(1) Colon cancer


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


(3) Weight loss


(4) Advanced age


(5) Weakness


Status:  LUCIAN Flores DO                 Aug 2, 2022 20:30

## 2022-08-02 NOTE — OCCUPATIONAL THER DAILY NOTE
OT Current Status-Daily Note


Subjective


Pt alert, sitting in recliner.  Pt agrees to therapy.  No c/o pain at this time.





Mental Status/Objective


Patient Orientation:  Person, Place, Time, Situation


Attachments:  Colostomy/Ileostomy





ADL-Treatment


Pt agrees to shower.  Wife assists with gathering clothing though pt is able to 

 clothing without stabilization from FWW and take to bathroom.  Pt req

uires assist to complete ostomy care, voiding independently.  Pt able to doff 

clothing in standing without LOB, using grabbars to step out of pants while 

standing.  Pt stands 90% of the time in shower using grabbars and hand held 

shower, no LOB noted, SBA for safety.  Pt able to complete upper body dressing 

by self.  SBA to thread clothing over feet due to standing while doing this and 

stabilizing with FWW.  MENENDEZ educated pt that it is more safe to sit to thread 

pants over feet, pt stated that is the way he usually does it.  Wife dons/doffs 

socks at baseline.  Pt dons/doff shoes by self, uses LH shoehorn to don.  

Standing at sink, pt able to complete oral care without stabilization from sink 

or FWW.


Therapy Code Descriptions/Definitions 





Functional [place] Measure:


0=Not Assessed/NA        4=Minimal Assistance


1=Total Assistance        5=Supervision or Setup


2=Maximal Assistance  6=Modified [place]


3=Moderate Assistance 7=Complete IndependenceSCALE: Activities may be completed 

with or without assistive devices.





6-Indepedent-patient completes the activity by him/herself with no assistance 

from a helper.


5-Set-up or Clean-up Assistance-helper sets up or cleans up; patient completes 

activity. [place] assists only prior to or  


    following the activity.


4-Supervision or Touching Assistance-helper provides verbal cues and/or 

touching/steadying and/or contact guard assistance as patient completes 

activity. Assistance may be provided   


    throughout the activity or intermittently.


3-Partial/Moderate Assistance-helper does LESS THAN HALF the effort. [place] 

lifts, holds or supports trunk or limbs, but provides less than half the effort.


2-Substantial/Maximal Assistance-helper does MORE THAN HALF the effort. [place] 

lifts or holds trunk or limbs and provides more than half the effort.


7-Rifdglkft-ntpdxl does ALL the effort. Patient does none of the effort to 

complete the activity. Or, the assistance of 2 or more helpers is required for 

the patient to complete the  


    activity.


If activity was not attempted, code reason:


7-Patient Refused.


9-Not Applicable-not attempted and the patient did not perform the activity 

before the current illness, exacerbation or injury.


10-Not Attempted due to Environmental Limitations-(lack of equipment, weather 

restraints, etc.).


88-Not Attempted due to Medical Conditions or Safety Concerns.


Eating (QC):  6 (Per clinical judgment.)


Oral Hygiene (QC):  6


Shower/Bathe Self (QC):  4


Upper Body Dressing (QC):  6


Lower Body Dressing (QC):  4


On/Off Footwear:  6 (shoes only)


Toileting Hygiene (QC):  2


Toilet Transfer (QC):  6





Other Treatment


Pt ambulated to therapy gym using FWW.  B UE exercises completed to increase 

strength and dexterity for daily functional tasks.  Nut/bolt task completed 2x's

(taking off/putting on).  Placing 1# wt on B wrists, pt completed grooved peg 

board to increase fine motor dexterity/coordination.  Pt given medium/heavy 

(green) theraband and HEP to use in room.  Pt was educated on each exercise 

though deferred to complete today due to increased fatigue of B UE's.  Will 

follow up on pt completing and becoming proficient with theraband exercises.  

After therapy, pt sitting in recliner with call light/phone in reach.  All needs

met in room.





OT Short Term Goals


Short Term Goals


Time Frame:  Aug 5, 2022


Eatin


Oral hygiene:  6


Toileting hygiene:  5


Shower/bathe self:  4


Upper body dressin


Lower body dressin


Putting on/taking off footwear:  5 (shoes only)





OT Long Term Goals


Long Term Goals


Time Frame:  Aug 11, 2022


Eating (QC):  6


Oral Hygiene (QC):  6


Toileting Hygiene (QC):  5 (set up for colostomy)


Shower/Bathe Self (QC):  5


Upper Body Dressing (QC):  6


Lower Body Dressing (QC):  6


On/Off Footwear (QC):  6 (shoes only)


1=Demonstrate adherence to instructed precautions during ADL tasks.


2=Patient will verbalize/demonstrate understanding of assistive 

devices/modifications for ADL.


3=Patient will improve strength/tolerance for activity to enable patient to 

perform ADL's.





OT Education/Plan


Problem List/Assessment


Assessment:  Decreased Activ Tolerance, Decreased UE Strength, Impaired Funct 

Balance





Discharge Recommendations


Plan/Recommendations:  Continue POC





Treatment Plan/Plan of Care


Patient would benefit from OT for education, treatment and training to promote 

independence in ADL's, mobility, safety and/or upper extremity function for 

ADL's.


Plan of Care:  ADL Retraining, Caregiver Training, Cognitive Retraining, 

Concurrent Therapy, Functional Mobility, Group Exercise/Act as Ind, UE Funct 

Exercise/Act


Treatment Duration:  Aug 11, 2022


Frequency:  At least 5 of 7 days/Wk (IRF)


Estimated Hrs Per Day:  1.5 hours per day (75-90 min/day )


Agreement:  Yes


Rehab Potential:  Good





Time/GCodes


Start Time:  10:00


Stop Time:  11:15


Total Time Billed (hr/min):  75


Billed Treatment Time


1 visit-ADL 4 (55 min)  EX 1 (20 min)











MEG ANNA                Aug 2, 2022 11:28

## 2022-08-02 NOTE — ST COGNITIVE LINGUISTIC EVAL
Speech Evaluation-General


Medical Diagnosis


Rectal Cancer, s/p Robot Assisted LAR w/Diverting Loop Ileostomy


Onset Date:  Jul 19, 2022





Therapy Diagnosis


Therapy Diagnosis:  Intact Neurocognitive Skills





Precautions


Precautions:  Fall


Precautions/Isolations:  Fall Prevention, Standard Precautions





Referral


Referring Physician:  Dr. Lazar


Reason for Referral:  Evaluation/Treatment





Medical History


Pertinent Medical History:  HTN


Current History


The patient is an 82 year old male with a past medical history significant for 

HTN, who presented to inpatient rehabilitation following a diagnosis of rectal 

adenocarcinoma status post robotic assisted low anterior resection with 

diverting loop ileostomy.


Reviewed History:  Yes





Social History


Current Living Status:  Spouse





Speech PLF-Current Status


Prior Level of Function





The patient denied recent or current concerns regarding his speech,


language or cognition.





Subjective


The patient was seated upright in his recliner, awake and alert upon entrance to

his room by the clinician. The patient's wife remains at bedside for the 

evaluation. The patient greeted the clinician appropriately and was agreeable to

participation in the cognitive linguistic assessment.





Language Eval: Auditory


Comprehends Simple Yes/No Ques:  Functional


Indent/Objects Multiple Fields:  Functional


Ident/Pics in Multiple Fields:  Functional


Follows 1-Step Commands:  Functional


Follows General Conversations:  Functional





Language Eval: Verbal Language


Completes Spontaneous Greeting:  Functional


Produces Auto, Serial Info:  Functional


Imitates Simple Words/Phrases:  Functional


Word Finding:  Functional


Requests Basic Needs:  Functional


States Basic Personal Info:  Functional





Cognitive


Patient Orientation


The patient was independently oriented to self, location, month, day of the 

week, date, and year.





Objective Cognitive Domain


Attention:  WNL


Memory:  WNL


Problem Solving:  Functional


Executive Functions:  WNL


Composite Severity Rating:  WNL





Objective


Formal/Standardized Tests


[employer] Mental Status Exam (UMS)


Results


The patient demonstrated results within normal neurocognitive limits on the 

provided SLUMS.


Oral Motor/Speech Production


The patient does not display dysarthria or apraxia of speech. The patient 

remains 100% intelligible in known and unknown contexts.


Impression


The patient demonstrated neurocognitive skills within normal limits.





Speech Patient Assess


Expression of Ideas/Wants:  Expression (4)


Understanding Verbal Content:  Understands (4)


Brief Interview-Mental Status:  Yes


Repetition of Three Words:  Three (3)


Temporal Orientation: Year:  Correct (3)


Temporal Orientation: Month:  Accurate within 5 days(2)


Temporal Orientation: Day:  Correct (1)


Recall : Wear to say "Sock":  Yes, no cue required (2)


Recall : Color:  Yes, no cue required (2)


Recall : Bed:  Yes, no cue required (2)


Memory/Recall Ability:  Current season, That he or she is in a hsp/hsp unit





Speech-Plan


Treatment Plan


Speech Therapy Treatment Plan:  Discontinue ST


Treatment Duration:  Aug 2, 2022


Frequency:  1 time per week


Estimated Hrs Per Day:  .5 hour per day


Rehab Potential:  Good





Safety Risks/Education


Teaching Recipient:  Patient, Significant Other


Teaching Methods:  Discussion


Response to Teaching:  Verbalize Understanding


Education Topics Provided:  


Results of KETAN, Plan of Care





Time


Speech Therapy Time In:  09:00


Speech Therapy Time Out:  09:16


Total Billed Time:  16


Billed Treatment Time


1, MELLY WILLS [facility] ST                Aug 2, 2022 10:35

## 2022-08-02 NOTE — INDIVIDUALIZED PLAN OF CARE
Individualized Plan of Care


Rehab Nursing IPOC Order


Admission Date


Aug 1, 2022 at 13:58


Current Orders





Orders


Follow-Up Appointment (8/1/22 11:09)


Lifting Restrictions (8/1/22 11:09)


Nursing Communication (Order) (8/1/22 11:26)


Nursing Communication (Order) (8/1/22 11:26)


Admission Arrival Bed Request (8/1/22 13:56)


Tramadol Tablet (Ultram Tablet) (8/1/22 14:15)


Lidocaine 4% Patch (Salonpas 4% Patch) (8/2/22 09:00)


Aspirin Enteric Coated Tablet (Ecotrin T (8/2/22 09:00)


Bacitracin Ointment (Bacitracin Ointment (8/1/22 21:00)


Cholecalciferol Capsule/Tablet (Vitamin (8/2/22 09:00)


Loperamide Tablet (Imodium Tablet) (8/1/22 17:00)


Metoprolol Succinate (Xl) Tab (Toprol Xl (8/2/22 09:00)


Montelukast Tablet (Singulair Tablet) (8/2/22 09:00)


Omega 3 Capsule (Fish Oil Capsule) (8/1/22 21:00)


Pantoprazole Tablet (Protonix Tablet) (8/2/22 09:00)


Rosuvastatin Tablet (Crestor Tablet) (8/2/22 09:00)


Tamsulosin Capsule (Flomax Capsule) (8/2/22 09:00)


Tramadol Tablet (Ultram Tablet) (8/1/22 14:15)


Vit A/C/E/Zinc/Co (Non-Form) (Preservisi (8/2/22 09:00)


Acetaminophen Tablet/Caplet (Tylenol  T (8/1/22 17:00)


Ascorbic Acid Tablet (Vitamin C Tablet) (8/2/22 09:00)


(Nf) Betamethasone Dipropionate (8/1/22 14:15)


Ferrous Sulfate Tablet (Feosol Tablet) (8/2/22 09:00)


(Nf) Gluc/Sergio-Msm#1/C/Gordon/Eduin/Bor (Ost (8/2/22 09:00)


Therapeutic Multivitamin Tab (Vitamins, (8/2/22 07:00)


(Nf) Potassium Citrate (Potassium) (8/1/22 21:00)


(Nf) Resveratrol (8/2/22 09:00)


(Nf) Ubidecarenone (Coq-10) (8/2/22 09:00)


(Nf) [Bjorn Treeze] (8/1/22 21:00)


(Nf) [Cramp Defense Magnes] (8/1/22 21:00)


(Nf) [Magic Mouthwash] (8/1/22 17:00)


Admission Order(Inpt,Obs,Sdc) (8/1/22 14:07)


Vital Signs: Per Unit Policy ( 08,16,00 (8/1/22 14:07)


Ivan Hose 09,21 (8/1/22 14:07)


Sequential Compression Device (8/1/22 14:07)


-Inpt Rehab Con (8/1/22 14:07)


Rehab Nursing Orders-Ipoc (8/1/22 14:07)


Physical Therapy Rehab Orders (8/1/22 14:07)


Occupational Therapy Rehab Ord (8/1/22 14:07)


Speech Therapy Rehab Orders (8/1/22 14:07)


Cbc With Automated Diff (8/2/22 06:00)


Comprehensive Metabolic Panel (8/2/22 06:00)


Precautions (Aru) (8/1/22 14:07)


Weekly Weight WEEK (8/1/22 14:07)


Rehab-Intensity Of Therapy (8/1/22 14:07)


Initiate Admission Nursing Pro .admission (8/1/22 14:07)


Alprazolam Tablet (Xanax Tablet) (8/1/22 14:15)


Calcium Carbonate Chew Tablet (Antacid C (8/1/22 14:15)


Diphenhydramine Tablet (Benadryl Tablet) (8/1/22 14:15)


Docusate Sodium Capsule (Colace Capsule) (8/1/22 21:00)


Docusate Sodium Capsule (Colace Capsule) (8/1/22 14:15)


Bisacodyl Suppository (Dulcolax Supposit (8/1/22 14:15)


Lactulose Oral Solution (Enulose Oral So (8/1/22 14:15)


Na Phos/Na Biphos Enema (Fleet Enema Alexys (8/1/22 14:15)


Guaifenesin/Codeine Syrup (Robitussin Ac (8/1/22 14:15)


Melatonin  Tablet (Melatonin  Tablet) (8/1/22 14:15)


Polyethylene Glycol Powder Pkt (Miralax (8/1/22 21:00)


Ondansetron  Oral Dissolve Tab (Zofran (8/1/22 14:15)


Senna S Tablet (Senokot S Tablet) (8/1/22 21:00)


Acetaminophen Tablet/Caplet (Tylenol  T (8/1/22 14:15)


Code/Resuscitation (8/1/22 14:12)


Ambulate 08,12,20 (8/1/22 14:12)


Sequential Compression Device ONCE (8/1/22 14:12)


Initiate Admission Nursing Pro .admission (8/1/22 14:12)


Isolation Central Supply Req (8/1/22 14:12)


Loratadine Tablet (Claritin Tablet) (8/2/22 09:00)


Patient Visit (8/1/22 )


Pt Eval Low Complexity (8/1/22 )


Functional Activities, Ea 15 (8/1/22 )


Diphenoxylate/Atropine Tablet (Lomotil T (8/1/22 21:00)


General/Regular (8/1/22 Dinner)


General/Regular (8/1/22 Lunch)


(Nf) [Magic Mouthwash] (8/2/22 12:00)


Diphenhydramine Oral Solution (Benadryl (8/2/22 07:51)


Diphenhydramine Oral Solution (Benadryl (8/2/22 10:15)


Patient Education (8/2/22 10:22)


Tamsulosin Capsule (Flomax Capsule) (8/3/22 09:00)


Patient Visit (8/2/22 )


Speech Sound Lang Comp (8/2/22 )


Treat. Speech/Lang/Voice (8/2/22 )


Dysphagia Evaluation Std (8/2/22 )


Dysphagia Therapy (8/2/22 )


Ensure Enlive (8/2/22 13:33)


Patient Visit (8/2/22 )


Functional Activities, Ea 15 (8/2/22 )


Exercise Therap, Ea 15 Min (8/2/22 )


Gait Training, Ea 15 Min (8/2/22 )





Rehab Nursing Orders:  Ongoing Assess. of Cognitive Status, Ongoing Assess. of 

Function Status, Bladder Management, Bladder Scan, Bladder Training, Bowel 

Management, Bowel Training, Disease Management & Educaiton, DVT Prophylaxis, 

Fall Prevention, Fluid/Electrolyte/Nutrition Mgmt, Infection Prevention, 

Medication Management & Education, Management of Risks & Complications, 

Management of Skin Intergrity, Nutrition Management, Pain Management, 

Patient/Family Support, Safety Management





Intensity of Therapy to be met


Patient to be seen:  Min.3h per day/5 of 7d





PT IPOC


Problem List:  Activity Tolerance


Treatment Plan:  Continue Plan of Care


Education, Functional Activity Foreign, Functional Strength, Group Therapy, Gait, 

Safety, Therapeutic Exercise, Transfers


Treatment Duration:  Aug 22, 2022


Frequency:  At least 5 of 7 days/Wk (IRF)


Estimated Hrs Per Day:  1.5 hours per day





OT IPOC


Problems:  Decreased Activ Tolerance, Decreased UE Strength, Impaired Funct 

Balance


OT Treatment, Training and Edu:  Yes


Plan of Care:  ADL Retraining, Caregiver Training, Cognitive Retraining, 

Concurrent Therapy, Functional Mobility, Group Exercise/Act as Ind, UE Funct 

Exercise/Act


Treatment Duration:  Aug 11, 2022


Frequency:  At least 5 of 7 days/Wk (IRF)


Estimated Hrs Per Day:  1.5 hours per day (75-90 min/day )





ST IPOC


Speech Therapy Treatment Plan:  Discontinue ST


Treatment Duration:  Aug 2, 2022


Frequency:  1 time per week


Estimated Hrs Per Day:  .5 hour per day





/Case Mgmt


/Case Managemen:  Discharge Planning





Dietitian/Nutritionist


Dietitian/Nutritionist to monitor nutritional status and make changes and/or 

recommendations as needed and work with speech pathology on dietary upgrades as 

the occur.





Physician IPOC


Medical Issues being managed closely and that require the 24 hour availability 

of a physician:





Recent resection of rectal adenocarcinoma with ileostomy now with high output 

increasing risk for dehydration and will require close monitoring for 

decompensation


Medical Issues:  Bowel/Bladder Function, DVT Prophylaxis, Falls Precautions, 

Fluid/Electrolyte/Nutrition Balance, Infection Protection, Pain Management, 

Wound Care


Brief Synthesis of Preadmission Screen, Post-Admission Evaluation, and Therapy 

Evaluations:





PT OT will focus on regaining function in order to return to independence with 

use of assistive devices


Medical Prognosis:  Good


Anticipated Length of Stay:  7 days











LUCIAN SAEED DO                 Aug 2, 2022 20:32

## 2022-08-02 NOTE — PHYSICAL THERAPY DAILY NOTE
PT Daily Note-Current


Subjective


Pt sitting in recliner visiting w/Sp upon arrival.  Pt agrees to walk with PTA.





Mental Status


Patient Orientation:  Person, Place, Time, Situation


Attachments:  Colostomy/Ileostomy





Transfers


SCALE: Activities may be completed with or without assistive devices.





6-Indepedent-patient completes the activity by him/herself with no assistance 

from a helper.


5-Set-up or Clean-up Assistance-helper sets up or cleans up; patient completes 

activity. [place] assists only prior to or  


    following the activity.


4-Supervision or Touching Assistance-helper provides verbal cues and/or 

touching/steadying and/or contact guard assistance as patient completes 

activity. Assistance may be provided   


    throughout the activity or intermittently.


3-Partial/Moderate Assistance-helper does LESS THAN HALF the effort. [place] 

lifts, holds or supports trunk or limbs, but provides less than half the effort.


2-Substantial/Maximal Assistance-helper does MORE THAN HALF the effort. [place] 

lifts or holds trunk or limbs and provides more than half the effort.


6-Snhvcabjr-oficjv does ALL the effort. Patient does none of the effort to 

complete the activity. Or, the assistance of 2 or more helpers is required for 

the patient to complete the  


    activity.


If activity was not attempted, code reason:


7-Patient Refused.


9-Not Applicable-not attempted and the patient did not perform the activity bef

ore the current illness, exacerbation or injury.


10-Not Attempted due to Environmental Limitations-(lack of equipment, weather r

estraints, etc.).


88-Not Attempted due to Medical Conditions or Safety Concerns.


Sit to Stand (QC):  5





Weight Bearing


Full Weight Bearing


Full Weight Bearing





Gait Training


Does the Patient Walk?:  Yes


Distance:  250', 150'


Walk 10 feet (QC):  5


Walk 50 ft with 2 Turns(QC):  5


Walk 150 ft (QC):  5


Gait Persons Needed:  1


Gait Assistive Device:  FWW





Treatments


TF to standing and amb. in hallway before returning to room to rest in recliner.

 All needs met, call light in hand.





Assessment


Current Status:  Good Progress


Pt is gaining gabby/speed with improved body control.





PT Short Term Goals


Short Term Goals


Time Frame:  Aug 8, 2022


Roll Left & Right:  6


Sit to lyin


Lying to sitting on side of be:  6


Sit to stand:  5


Chair/bed-to-chair transfer:  5


Walk 10 feet:  5


Walk 50 feet with two turns:  5


Walk 150 feet:  5





PT Long Term Goals


Long Term Goals


PT Long Term Goals Time Frame:  Aug 22, 2022


Roll Left & Right (QC):  6


Sit to Lying (QC):  6


Lying-Sitting on Side/Bed(QC):  6


Sit to Stand (QC):  6


Chair/Bed-to-Chair Xfer(QC):  6


Toilet Transfer (QC):  6


Car Transfer (QC):  6


Does the Patient Walk:  Yes


Walk 10 feet (QC):  6


Walk 50ft with 2 Turns (QC):  6


Walk 150 ft (QC):  6


Walking 10ft on Uneven Surface:  6


1 Step (curb) (QC):  6


4 Steps (QC):  6


12 Steps (QC):  6


Picking up an Object (QC):  6


Wheel 50 feet with 2 turns (QC:  9


Wheel 150 feet:  9





PT Plan


Problem List


Problem List:  Activity Tolerance





Treatment/Plan


Treatment Plan:  Continue Plan of Care


Treatment Plan:  Education, Functional Activity Foreign, Functional Strength, 

Group Therapy, Gait, Safety, Therapeutic Exercise, Transfers


Treatment Duration:  Aug 22, 2022


Frequency:  At least 5 of 7 days/Wk (IRF)


Estimated Hrs Per Day:  1.5 hours per day


Patient and/or Family Agrees t:  Yes





Time/GCodes


Time In:  1400


Time Out:  1415


Total Billed Treatment Time:  15


Total Billed Treatment


1, GT (15m)











JOHN LOPES PTA               Aug 2, 2022 15:15

## 2022-08-03 VITALS — SYSTOLIC BLOOD PRESSURE: 125 MMHG | DIASTOLIC BLOOD PRESSURE: 56 MMHG

## 2022-08-03 VITALS — DIASTOLIC BLOOD PRESSURE: 63 MMHG | SYSTOLIC BLOOD PRESSURE: 137 MMHG

## 2022-08-03 RX ADMIN — LORATADINE SCH MG: 10 TABLET ORAL at 07:36

## 2022-08-03 RX ADMIN — ASPIRIN SCH MG: 81 TABLET ORAL at 07:35

## 2022-08-03 RX ADMIN — DOCUSATE SODIUM SCH MG: 100 CAPSULE ORAL at 12:50

## 2022-08-03 RX ADMIN — OXYCODONE HYDROCHLORIDE AND ACETAMINOPHEN SCH MG: 500 TABLET ORAL at 07:35

## 2022-08-03 RX ADMIN — DIPHENOXYLATE HYDROCHLORIDE AND ATROPINE SULFATE SCH EA: 2.5; .025 TABLET ORAL at 20:12

## 2022-08-03 RX ADMIN — ROSUVASTATIN CALCIUM SCH MG: 20 TABLET, FILM COATED ORAL at 07:36

## 2022-08-03 RX ADMIN — BACITRACIN SCH EACH: 500 OINTMENT TOPICAL at 07:40

## 2022-08-03 RX ADMIN — LOPERAMIDE HYDROCHLORIDE SCH MG: 2 CAPSULE ORAL at 20:12

## 2022-08-03 RX ADMIN — CARVEDILOL SCH MG: 25 TABLET, FILM COATED ORAL at 17:41

## 2022-08-03 RX ADMIN — TAMSULOSIN HYDROCHLORIDE SCH MG: 0.4 CAPSULE ORAL at 07:35

## 2022-08-03 RX ADMIN — Medication SCH MCG: at 07:36

## 2022-08-03 RX ADMIN — SODIUM CHLORIDE SCH MLS/HR: 900 INJECTION, SOLUTION INTRAVENOUS at 22:50

## 2022-08-03 RX ADMIN — CARVEDILOL SCH MG: 25 TABLET, FILM COATED ORAL at 17:42

## 2022-08-03 RX ADMIN — LOPERAMIDE HYDROCHLORIDE SCH MG: 2 CAPSULE ORAL at 07:35

## 2022-08-03 RX ADMIN — SODIUM CHLORIDE SCH MLS/HR: 900 INJECTION, SOLUTION INTRAVENOUS at 12:57

## 2022-08-03 RX ADMIN — DIPHENOXYLATE HYDROCHLORIDE AND ATROPINE SULFATE SCH EA: 2.5; .025 TABLET ORAL at 07:36

## 2022-08-03 RX ADMIN — POLYETHYLENE GLYCOL (3350) SCH GM: 17 POWDER, FOR SOLUTION ORAL at 19:42

## 2022-08-03 RX ADMIN — Medication SCH EA: at 06:05

## 2022-08-03 RX ADMIN — DOCUSATE SODIUM AND SENNOSIDES SCH EA: 8.6; 5 TABLET, FILM COATED ORAL at 19:42

## 2022-08-03 RX ADMIN — POLYETHYLENE GLYCOL (3350) SCH GM: 17 POWDER, FOR SOLUTION ORAL at 12:50

## 2022-08-03 RX ADMIN — LOPERAMIDE HYDROCHLORIDE SCH MG: 2 CAPSULE ORAL at 12:57

## 2022-08-03 RX ADMIN — METOPROLOL SUCCINATE SCH MG: 100 TABLET, EXTENDED RELEASE ORAL at 07:36

## 2022-08-03 RX ADMIN — LIDOCAINE SCH EA: 50 PATCH CUTANEOUS at 07:37

## 2022-08-03 RX ADMIN — OMEGA-3 FATTY ACIDS CAP 1000 MG SCH MG: 1000 CAP at 07:35

## 2022-08-03 RX ADMIN — DOCUSATE SODIUM SCH MG: 100 CAPSULE ORAL at 19:41

## 2022-08-03 RX ADMIN — MONTELUKAST SCH MG: 10 TABLET, FILM COATED ORAL at 20:12

## 2022-08-03 RX ADMIN — OMEGA-3 FATTY ACIDS CAP 1000 MG SCH MG: 1000 CAP at 20:12

## 2022-08-03 RX ADMIN — LOPERAMIDE HYDROCHLORIDE SCH MG: 2 CAPSULE ORAL at 17:41

## 2022-08-03 RX ADMIN — SODIUM CHLORIDE SCH MLS/HR: 900 INJECTION, SOLUTION INTRAVENOUS at 17:41

## 2022-08-03 RX ADMIN — DOCUSATE SODIUM AND SENNOSIDES SCH EA: 8.6; 5 TABLET, FILM COATED ORAL at 12:50

## 2022-08-03 RX ADMIN — Medication SCH CAP: at 12:50

## 2022-08-03 RX ADMIN — FERROUS SULFATE TAB 325 MG (65 MG ELEMENTAL FE) SCH MG: 325 (65 FE) TAB at 07:36

## 2022-08-03 RX ADMIN — BACITRACIN SCH EACH: 500 OINTMENT TOPICAL at 20:13

## 2022-08-03 RX ADMIN — PANTOPRAZOLE SODIUM SCH MG: 40 TABLET, DELAYED RELEASE ORAL at 07:36

## 2022-08-03 NOTE — PHYSICAL THERAPY DAILY NOTE
PT Daily Note-Current


Subjective


Pt. agrees to Rx, describes his steps at home and his low sitting car





Pain





   Location:  No Pain Reported





Mental Status


Patient Orientation:  Normal For Age


Attachments:  Colostomy/Ileostomy, IV





Transfers


SCALE: Activities may be completed with or without assistive devices.





6-Indepedent-patient completes the activity by him/herself with no assistance 

from a helper.


5-Set-up or Clean-up Assistance-helper sets up or cleans up; patient completes 

activity. [place] assists only prior to or  


    following the activity.


4-Supervision or Touching Assistance-helper provides verbal cues and/or 

touching/steadying and/or contact guard assistance as patient completes 

activity. Assistance may be provided   


    throughout the activity or intermittently.


3-Partial/Moderate Assistance-helper does LESS THAN HALF the effort. [place] 

lifts, holds or supports trunk or limbs, but provides less than half the effort.


2-Substantial/Maximal Assistance-helper does MORE THAN HALF the effort. [place] 

lifts or holds trunk or limbs and provides more than half the effort.


1-Mdjylnfbo-rcctio does ALL the effort. Patient does none of the effort to 

complete the activity. Or, the assistance of 2 or more helpers is required for 

the patient to complete the  


    activity.


If activity was not attempted, code reason:


7-Patient Refused.


9-Not Applicable-not attempted and the patient did not perform the activity 

before the current illness, exacerbation or injury.


10-Not Attempted due to Environmental Limitations-(lack of equipment, weather 

restraints, etc.).


88-Not Attempted due to Medical Conditions or Safety Concerns.


Car Transfer (QC):  6





Weight Bearing


Full Weight Bearing


Full Weight Bearing





Gait Training


Gait Assistive Device:  FWW


165 ft x 2 SBA FWW





Stair Training


 Stair Training: Handrails/:  2 handrails


#of Steps:  4


4 Steps (QC):  4


Stairs:  Pattern:  Reciprocal


reciprocal ascending, step to descending, no LOB, uses rails bilat





Exercises


Seated Therapy Exercises:  Ankle pumps, Sit to stand, Long arc quads, Hip 

flexion, Hip abd/add


Seated Reps:  10





Assessment


Current Status:  Good Progress





PT Short Term Goals


Short Term Goals


Time Frame:  Aug 8, 2022


Roll Left & Right:  6


Sit to lyin


Lying to sitting on side of be:  6


Sit to stand:  5


Chair/bed-to-chair transfer:  5


Walk 10 feet:  5


Walk 50 feet with two turns:  5


Walk 150 feet:  5





PT Long Term Goals


Long Term Goals


PT Long Term Goals Time Frame:  Aug 22, 2022


Roll Left & Right (QC):  6


Sit to Lying (QC):  6


Lying-Sitting on Side/Bed(QC):  6


Sit to Stand (QC):  6


Chair/Bed-to-Chair Xfer(QC):  6


Toilet Transfer (QC):  6


Car Transfer (QC):  6


Does the Patient Walk:  Yes


Walk 10 feet (QC):  6


Walk 50ft with 2 Turns (QC):  6


Walk 150 ft (QC):  6


Walking 10ft on Uneven Surface:  6


1 Step (curb) (QC):  6


4 Steps (QC):  6


12 Steps (QC):  6


Picking up an Object (QC):  6


Wheel 50 feet with 2 turns (QC:  9


Wheel 150 feet:  9





PT Plan


Treatment/Plan


Treatment Plan:  Continue Plan of Care


Treatment Plan:  Education, Functional Activity Foreign, Functional Strength, 

Group Therapy, Gait, Safety, Therapeutic Exercise, Transfers


Treatment Duration:  Aug 22, 2022


Frequency:  At least 5 of 7 days/Wk (IRF)


Estimated Hrs Per Day:  1.5 hours per day


Patient and/or Family Agrees t:  Yes





Safety Risks/Education


Patient Education:  Gait Training, Transfer Techniques, Steps, Correct 

Positioning, Disease Process, Safety Issues


Teaching Recipient:  Patient


Teaching Methods:  Demonstration, Discussion


Response to Teaching:  Verbalize Understanding, Return Demonstration, R

einforcement Needed





Time/GCodes


Time In:  1300


Time Out:  1330


Total Billed Treatment Time:  30


Total Billed Treatment


1,FA17m,GT13m











JACQUI JOLLY PTA              Aug 3, 2022 13:29

## 2022-08-03 NOTE — PM&R PROGRESS NOTE
Subjective


HPI/CC On Admission


Date Seen by Provider:  Aug 3, 2022


Time Seen by Provider:  09:00


Subjective/Events-last exam


8/3/2022:


Doing well


Increased output from ileostomy 700+ so will initiate IVF


[facility] had delayed DC due to high output


Improved otherwise








8/2/2022:


Doing better


Ileostomy functioning well


Gaining strength


Slept better last night


Labs reviewed


No pain





Review of Systems


General:  Fatigue, Malaise





Objective


Exam


Vital Signs





Vital Signs








  Date Time  Temp Pulse Resp B/P (MAP) Pulse Ox O2 Delivery O2 Flow Rate FiO2


 


8/3/22 08:44      Room Air  


 


8/3/22 07:36 36.6 73 18 137/63 ([phone]   





Capillary Refill :


General Appearance:  No Apparent Distress, WD/WN, Chronically ill


HEENT:  PERRL/EOMI, Normal ENT Inspection, Pharynx Normal


Neck:  Full Range of Motion, Normal Inspection, Non Tender, Supple, Carotid 

Bruit


Respiratory:  Chest Non Tender, Lungs Clear, Normal Breath Sounds, No Accessory 

Muscle Use, No Respiratory Distress


Cardiovascular:  Regular Rate, Rhythm, No Edema, No Gallop, No JVD, No Murmur, 

Normal Peripheral Pulses


Gastrointestinal:  Normal Bowel Sounds, No Organomegaly, No Pulsatile Mass, Non 

Tender, Soft


Back:  Normal Inspection, No CVA Tenderness, No Vertebral Tenderness


Extremity:  Normal Capillary Refill, Normal Inspection, Normal Range of Motion, 

Non Tender, No Calf Tenderness, No Pedal Edema


Neurologic/Psychiatric:  Alert, Oriented x3, CNs II-XII Norm as Tested, Abnormal

Gait, Depressed Affect, Motor Weakness (generalized)


Skin:  Normal Color, Warm/Dry


Lymphatic:  No Adenopathy





Results/Procedures


Lab


Patient resulted labs reviewed.





FIM


Transfers


Therapy Code Descriptions/Definitions 





Functional [place] Measure:


0=Not Assessed/NA        4=Minimal Assistance


1=Total Assistance        5=Supervision or Setup


2=Maximal Assistance  6=Modified [place]


3=Moderate Assistance 7=Complete IndependenceSCALE: Activities may be completed 

with or without assistive devices.





6-Indepedent-patient completes the activity by him/herself with no assistance 

from a helper.


5-Set-up or Clean-up Assistance-helper sets up or cleans up; patient completes 

activity. [place] assists only prior to or  


    following the activity.


4-Supervision or Touching Assistance-helper provides verbal cues and/or 

touching/steadying and/or contact guard assistance as patient completes 

activity. Assistance may be provided   


    throughout the activity or intermittently.


3-Partial/Moderate Assistance-helper does LESS THAN HALF the effort. [place] 

lifts, holds or supports trunk or limbs, but provides less than half the effort.


2-Substantial/Maximal Assistance-helper does MORE THAN HALF the effort. [place] 

lifts or holds trunk or limbs and provides more than half the effort.


5-Idjcplbvm-anoeny does ALL the effort. Patient does none of the effort to 

complete the activity. Or, the assistance of 2 or more helpers is required for 

the patient to complete the  


    activity.


If activity was not attempted, code reason:


7-Patient Refused.


9-Not Applicable-not attempted and the patient did not perform the activity 

before the current illness, exacerbation or injury.


10-Not Attempted due to Environmental Limitations-(lack of equipment, weather 

restraints, etc.).


88-Not Attempted due to Medical Conditions or Safety Concerns.


Roll Left to Right (QC):  6


Sit to Lying (QC):  6


Sit to Stand (QC):  5


Chair/Bed-to-Chair Xfer(QC):  4


Car Transfer (QC):  4





Gait Training


Does the Patient Walk?:  Yes


Distance:  250', 150'


Walk 10 feet (QC):  5


Walk 50 ft with 2 Turns(QC):  5


Walk 150 ft (QC):  5


Walking 10ft/uneven surface-QC:  4


Gait Persons Needed:  1


Gait Assistive Device:  FWW





Wheelchair Training


Does the Pt Use a Wheelchair?:  No


Wheel 50 ft with 2 turns (QC):  9


Wheel 150 ft (QC):  9





Stair Training


#of Steps:  4


1 Step (curb) (QC):  4


4 Steps (QC):  4


12 Steps (QC):  88





Balance


Picking up an Object (QC):  4 (SBA with reacher)





ADL-Treatment


Eating (QC):  6 (Per clinical judgment.)


Oral Hygiene (QC):  6


Shower/Bathe Self (QC):  4


Upper Body Dressing (QC):  6


Lower Body Dressing (QC):  4


On/Off Footwear (QC):  6 (shoes only)


Toileting Hygiene (QC):  2


Toilet Transfer (QC):  6





Assessment/Plan


Assessment and Plan


Assess & Plan/Chief Complaint


Assessment:


Myopathy


Rectal adenocarcinoma resection with ileostomy


Long hauler COVID symptoms


Weight loss 45 pounds


BPH


Anemia








Plan:


Monitor closely


Home meds


IRF protocol





8/2/2022:


Monitor fluid intake


Lomotil and Imodium








8/3/2022:


IVF


Monitor closely





(1) Colon cancer


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


(3) Weight loss


(4) Advanced age


(5) Weakness


Status:  Acute











LUCIAN SAEED DO                 Aug 3, 2022 06:03

## 2022-08-03 NOTE — OCCUPATIONAL THER DAILY NOTE
OT Current Status-Daily Note


Subjective


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

plugged up today and he was having a hard time hearing people talk. No reports 

of pain.





Appearance


Alert, cooperative, agreeable to OT.





Mental Status/Objective


Patient Orientation:  Person, Place, Time, Situation


Attachments:  Colostomy/Ileostomy





ADL-Treatment


Pt walked to the bathroom with SBA, FWW to brush teeth, standing at counter top,

and wash hands independently. At the end of treatment, he walked into the 

bathroom with FWW, transferred on and off toilet without assistance, managed 

clothing and hygiene, then washed hands at sink independently. Pt transferred 

into bed with no help at end of tx. Call light in place and wife present.


Therapy Code Descriptions/Definitions 





Functional [place] Measure:


0=Not Assessed/NA        4=Minimal Assistance


1=Total Assistance        5=Supervision or Setup


2=Maximal Assistance  6=Modified [place]


3=Moderate Assistance 7=Complete IndependenceSCALE: Activities may be completed 

with or without assistive devices.





6-Indepedent-patient completes the activity by him/herself with no assistance 

from a helper.


5-Set-up or Clean-up Assistance-helper sets up or cleans up; patient completes 

activity. [place] assists only prior to or  


    following the activity.


4-Supervision or Touching Assistance-helper provides verbal cues and/or 

touching/steadying and/or contact guard assistance as patient completes 

activity. Assistance may be provided   


    throughout the activity or intermittently.


3-Partial/Moderate Assistance-helper does LESS THAN HALF the effort. [place] 

lifts, holds or supports trunk or limbs, but provides less than half the effort.


2-Substantial/Maximal Assistance-helper does MORE THAN HALF the effort. [place] 

lifts or holds trunk or limbs and provides more than half the effort.


9-Ailaczygf-qwdrzo does ALL the effort. Patient does none of the effort to 

complete the activity. Or, the assistance of 2 or more helpers is required for 

the patient to complete the  


    activity.


If activity was not attempted, code reason:


7-Patient Refused.


9-Not Applicable-not attempted and the patient did not perform the activity 

before the current illness, exacerbation or injury.


10-Not Attempted due to Environmental Limitations-(lack of equipment, weather 

restraints, etc.).


88-Not Attempted due to Medical Conditions or Safety Concerns.


Oral Hygiene (QC):  6


Toileting Hygiene (QC):  6 (Urination only. Tall toilet, grab bar)


Toilet Transfer (QC):  6 (Tall toilet, grab bar)





Other Treatment


Pt walked with SBA, FWW to gym and transferred to chair without help. Completed 

10 minutes bilat Ue exercise on arm bike set at 20W resistance, taking one short

recovery break at midpoint. Also completed 1" pegs with 1/4" stems and    nuts 

and bolts with 1# weight on each arm, all to strengthen arms and activity 

tolerance as needed for ADLs post surgery. Completed 10 reps bilat UE exercise 

with exercise bar, working on shoulders, elbows, forearms and wrists, as needed 

to increase UE strength and activity tolerance for ADLs. Also completed bilat 

elbow flex and ext with 1# weight. Pt education on the specific individual 

exercises. Pt has pitting edema L hand and forearm from IV and it responded well

to active ROM L hand. Pt would benefit from additional activities to manage 

edema and may also benefit from Isotoner glove for edema management. Pt walked 

back to room after activities and exercise.





Education


OT Patient Education:  Exercise program, Purpose of tx/functional activities, 

Other (Edema management)


Teaching Recipient:  Patient


Teaching Methods:  Demonstration, Discussion


Response to Teaching:  Verbalize Understanding, Return Demonstration





OT Short Term Goals


Short Term Goals


Time Frame:  Aug 5, 2022


Eatin


Oral hygiene:  6


Toileting hygiene:  5


Shower/bathe self:  4


Upper body dressin


Lower body dressin


Putting on/taking off footwear:  5 (shoes only)





OT Long Term Goals


Long Term Goals


Time Frame:  Aug 11, 2022


Eating (QC):  6


Oral Hygiene (QC):  6


Toileting Hygiene (QC):  5 (set up for colostomy)


Shower/Bathe Self (QC):  5


Upper Body Dressing (QC):  6


Lower Body Dressing (QC):  6


On/Off Footwear (QC):  6 (shoes only)


Add: Edema management to assist with use of L UE during ADLs- goal pt 

independent with HEP


1=Demonstrate adherence to instructed precautions during ADL tasks.


2=Patient will verbalize/demonstrate understanding of assistive devic

es/modifications for ADL.


3=Patient will improve strength/tolerance for activity to enable patient to 

perform ADL's.





OT Education/Plan


Discharge Recommendations


Plan


Add edema management interventions, with goal that pt is indep with HEP


Plan/Recommendations:  Continue POC





Treatment Plan/Plan of Care


Treatment,Training & Education:  Yes


Patient would benefit from OT for education, treatment and training to promote 

independence in ADL's, mobility, safety and/or upper extremity function for 

ADL's.


Plan of Care:  ADL Retraining, Caregiver Training, Cognitive Retraining, 

Concurrent Therapy, Functional Mobility, Group Exercise/Act as Ind, UE Funct 

Exercise/Act


Treatment Duration:  Aug 11, 2022


Frequency:  At least 5 of 7 days/Wk (IRF)


Estimated Hrs Per Day:  1.5 hours per day (75-90 min/day )


Agreement:  Yes


Rehab Potential:  Good





Time/GCodes


Start Time:  09:00


Stop Time:  10:30


Total Time Billed (hr/min):  90


Billed Treatment Time


Visit, ADL 20 minutes, exercise 70 minutes











SHRUTHI RATLIFF OT               Aug 3, 2022 12:28

## 2022-08-03 NOTE — PHYSICAL THERAPY DAILY NOTE
PT Daily Note-Current


Subjective


Pt. agreeable to Rx states he feels he is slowly making progress. "I've had long

haul Covid you know"





Pain





   Location:  No Pain Reported





Mental Status


Patient Orientation:  Normal For Age


Attachments:  Colostomy/Ileostomy





Transfers


SCALE: Activities may be completed with or without assistive devices.





6-Indepedent-patient completes the activity by him/herself with no assistance 

from a helper.


5-Set-up or Clean-up Assistance-helper sets up or cleans up; patient completes 

activity. [place] assists only prior to or  


    following the activity.


4-Supervision or Touching Assistance-helper provides verbal cues and/or 

touching/steadying and/or contact guard assistance as patient completes activit

y. Assistance may be provided   


    throughout the activity or intermittently.


3-Partial/Moderate Assistance-helper does LESS THAN HALF the effort. [place] 

lifts, holds or supports trunk or limbs, but provides less than half the effort.


2-Substantial/Maximal Assistance-helper does MORE THAN HALF the effort. [place] 

lifts or holds trunk or limbs and provides more than half the effort.


3-Jaqzyvzyu-pvbvhn does ALL the effort. Patient does none of the effort to 

complete the activity. Or, the assistance of 2 or more helpers is required for 

the patient to complete the  


    activity.


If activity was not attempted, code reason:


7-Patient Refused.


9-Not Applicable-not attempted and the patient did not perform the activity 

before the current illness, exacerbation or injury.


10-Not Attempted due to Environmental Limitations-(lack of equipment, weather 

restraints, etc.).


88-Not Attempted due to Medical Conditions or Safety Concerns.


Roll Left & Right (QC):  6


Sit to Lying (QC):  6


Lying to Sitting/Side of Bed(Q:  6


Sit to Stand (QC):  6


Chair/Bed-to-Chair Xfer(QC):  6





Weight Bearing


Full Weight Bearing


Full Weight Bearing





Gait Training


Does the Patient Walk?:  Yes


Walk 10 feet (QC):  6


Walk 50 ft with 2 Turns(QC):  6


Walk 150 ft (QC):  6


Gait Persons Needed:  1


Gait Assistive Device:  FWW


no LOB, equal step length good velocity





Exercises


Supine Ex:  Bridging, Ankle pumps, Quad Set, Rolling, Glut sets, Heel Slides, 

Short Arc Quads, Scooting, Straight leg raise, Hip abd/add (sidelying)


Supine Reps:  15


Seated Therapy Exercises:  Ankle pumps, Sit to stand, Long arc quads, Hip 

flexion, Hip abd/add


Seated Reps:  15


NuStep Minutes:  10


 NuStep Workload:  2





Assessment


Current Status:  Good Progress





PT Short Term Goals


Short Term Goals


Time Frame:  Aug 8, 2022


Roll Left & Right:  6


Sit to lyin


Lying to sitting on side of be:  6


Sit to stand:  5


Chair/bed-to-chair transfer:  5


Walk 10 feet:  5


Walk 50 feet with two turns:  5


Walk 150 feet:  5





PT Long Term Goals


Long Term Goals


PT Long Term Goals Time Frame:  Aug 22, 2022


Roll Left & Right (QC):  6


Sit to Lying (QC):  6


Lying-Sitting on Side/Bed(QC):  6


Sit to Stand (QC):  6


Chair/Bed-to-Chair Xfer(QC):  6


Toilet Transfer (QC):  6


Car Transfer (QC):  6


Does the Patient Walk:  Yes


Walk 10 feet (QC):  6


Walk 50ft with 2 Turns (QC):  6


Walk 150 ft (QC):  6


Walking 10ft on Uneven Surface:  6


1 Step (curb) (QC):  6


4 Steps (QC):  6


12 Steps (QC):  6


Picking up an Object (QC):  6


Wheel 50 feet with 2 turns (QC:  9


Wheel 150 feet:  9





PT Plan


Treatment/Plan


Treatment Plan:  Continue Plan of Care


Treatment Plan:  Education, Functional Activity Foreign, Functional Strength, 

Group Therapy, Gait, Safety, Therapeutic Exercise, Transfers


Treatment Duration:  Aug 22, 2022


Frequency:  At least 5 of 7 days/Wk (IRF)


Estimated Hrs Per Day:  1.5 hours per day


Patient and/or Family Agrees t:  Yes





Safety Risks/Education


Patient Education:  Gait Training, Transfer Techniques, Correct Positioning, 

Disease Process, Safety Issues


Teaching Recipient:  Patient


Teaching Methods:  Demonstration, Discussion


Response to Teaching:  Verbalize Understanding, Return Demonstration, 

Reinforcement Needed





Time/GCodes


Time In:  1100


Time Out:  1200


Total Billed Treatment Time:  60


Total Billed Treatment


1,EX40m,GT20m











JACQUI JOLLY PTA              Aug 3, 2022 11:59

## 2022-08-04 VITALS — SYSTOLIC BLOOD PRESSURE: 117 MMHG | DIASTOLIC BLOOD PRESSURE: 57 MMHG

## 2022-08-04 VITALS — SYSTOLIC BLOOD PRESSURE: 129 MMHG | DIASTOLIC BLOOD PRESSURE: 62 MMHG

## 2022-08-04 LAB
ALBUMIN SERPL-MCNC: 2.8 GM/DL (ref 3.2–4.5)
ALP SERPL-CCNC: 63 U/L (ref 40–136)
ALT SERPL-CCNC: 31 U/L (ref 0–55)
BASOPHILS # BLD AUTO: 0.1 10^3/UL (ref 0–0.1)
BASOPHILS NFR BLD AUTO: 1 % (ref 0–10)
BILIRUB SERPL-MCNC: 0.4 MG/DL (ref 0.1–1)
BUN/CREAT SERPL: 11
CALCIUM SERPL-MCNC: 8.5 MG/DL (ref 8.5–10.1)
CHLORIDE SERPL-SCNC: 106 MMOL/L (ref 98–107)
CO2 SERPL-SCNC: 22 MMOL/L (ref 21–32)
CREAT SERPL-MCNC: 0.83 MG/DL (ref 0.6–1.3)
EOSINOPHIL # BLD AUTO: 0.2 10^3/UL (ref 0–0.3)
EOSINOPHIL NFR BLD AUTO: 4 % (ref 0–10)
GFR SERPLBLD BASED ON 1.73 SQ M-ARVRAT: 86 ML/MIN
GLUCOSE SERPL-MCNC: 113 MG/DL (ref 70–105)
HCT VFR BLD CALC: 32 % (ref 40–54)
HGB BLD-MCNC: 10.5 G/DL (ref 13.3–17.7)
LYMPHOCYTES # BLD AUTO: 1 10^3/UL (ref 1–4)
LYMPHOCYTES NFR BLD AUTO: 17 % (ref 12–44)
MAGNESIUM SERPL-MCNC: 1.6 MG/DL (ref 1.6–2.4)
MANUAL DIFFERENTIAL PERFORMED BLD QL: NO
MCH RBC QN AUTO: 32 PG (ref 25–34)
MCHC RBC AUTO-ENTMCNC: 33 G/DL (ref 32–36)
MCV RBC AUTO: 97 FL (ref 80–99)
MONOCYTES # BLD AUTO: 0.9 10^3/UL (ref 0–1)
MONOCYTES NFR BLD AUTO: 15 % (ref 0–12)
NEUTROPHILS # BLD AUTO: 3.7 10^3/UL (ref 1.8–7.8)
NEUTROPHILS NFR BLD AUTO: 63 % (ref 42–75)
PLATELET # BLD: 286 10^3/UL (ref 130–400)
PMV BLD AUTO: 8.9 FL (ref 9–12.2)
POTASSIUM SERPL-SCNC: 3.8 MMOL/L (ref 3.6–5)
PROT SERPL-MCNC: 5.5 GM/DL (ref 6.4–8.2)
SODIUM SERPL-SCNC: 137 MMOL/L (ref 135–145)
WBC # BLD AUTO: 5.9 10^3/UL (ref 4.3–11)

## 2022-08-04 RX ADMIN — FERROUS SULFATE TAB 325 MG (65 MG ELEMENTAL FE) SCH MG: 325 (65 FE) TAB at 08:56

## 2022-08-04 RX ADMIN — METOPROLOL SUCCINATE SCH MG: 100 TABLET, EXTENDED RELEASE ORAL at 08:54

## 2022-08-04 RX ADMIN — OMEGA-3 FATTY ACIDS CAP 1000 MG SCH MG: 1000 CAP at 08:54

## 2022-08-04 RX ADMIN — Medication SCH EA: at 06:06

## 2022-08-04 RX ADMIN — BACITRACIN SCH EACH: 500 OINTMENT TOPICAL at 21:30

## 2022-08-04 RX ADMIN — TAMSULOSIN HYDROCHLORIDE SCH MG: 0.4 CAPSULE ORAL at 08:54

## 2022-08-04 RX ADMIN — SODIUM CHLORIDE SCH MLS/HR: 900 INJECTION, SOLUTION INTRAVENOUS at 09:10

## 2022-08-04 RX ADMIN — Medication SCH MCG: at 08:54

## 2022-08-04 RX ADMIN — DIPHENOXYLATE HYDROCHLORIDE AND ATROPINE SULFATE SCH EA: 2.5; .025 TABLET ORAL at 21:30

## 2022-08-04 RX ADMIN — LORATADINE SCH MG: 10 TABLET ORAL at 08:54

## 2022-08-04 RX ADMIN — MONTELUKAST SCH MG: 10 TABLET, FILM COATED ORAL at 21:30

## 2022-08-04 RX ADMIN — LIDOCAINE SCH EA: 50 PATCH CUTANEOUS at 08:56

## 2022-08-04 RX ADMIN — ASPIRIN SCH MG: 81 TABLET ORAL at 08:54

## 2022-08-04 RX ADMIN — DOCUSATE SODIUM AND SENNOSIDES SCH EA: 8.6; 5 TABLET, FILM COATED ORAL at 21:00

## 2022-08-04 RX ADMIN — ROSUVASTATIN CALCIUM SCH MG: 20 TABLET, FILM COATED ORAL at 08:53

## 2022-08-04 RX ADMIN — LOPERAMIDE HYDROCHLORIDE SCH MG: 2 CAPSULE ORAL at 17:30

## 2022-08-04 RX ADMIN — DIPHENOXYLATE HYDROCHLORIDE AND ATROPINE SULFATE SCH EA: 2.5; .025 TABLET ORAL at 08:54

## 2022-08-04 RX ADMIN — POLYETHYLENE GLYCOL (3350) SCH GM: 17 POWDER, FOR SOLUTION ORAL at 21:00

## 2022-08-04 RX ADMIN — PANTOPRAZOLE SODIUM SCH MG: 40 TABLET, DELAYED RELEASE ORAL at 08:54

## 2022-08-04 RX ADMIN — OXYCODONE HYDROCHLORIDE AND ACETAMINOPHEN SCH MG: 500 TABLET ORAL at 08:54

## 2022-08-04 RX ADMIN — Medication SCH CAP: at 09:06

## 2022-08-04 RX ADMIN — BACITRACIN SCH EACH: 500 OINTMENT TOPICAL at 08:56

## 2022-08-04 RX ADMIN — DOCUSATE SODIUM SCH MG: 100 CAPSULE ORAL at 09:05

## 2022-08-04 RX ADMIN — DOCUSATE SODIUM AND SENNOSIDES SCH EA: 8.6; 5 TABLET, FILM COATED ORAL at 09:07

## 2022-08-04 RX ADMIN — OMEGA-3 FATTY ACIDS CAP 1000 MG SCH MG: 1000 CAP at 21:30

## 2022-08-04 RX ADMIN — LOPERAMIDE HYDROCHLORIDE SCH MG: 2 CAPSULE ORAL at 08:54

## 2022-08-04 RX ADMIN — POLYETHYLENE GLYCOL (3350) SCH GM: 17 POWDER, FOR SOLUTION ORAL at 09:06

## 2022-08-04 RX ADMIN — LOPERAMIDE HYDROCHLORIDE SCH MG: 2 CAPSULE ORAL at 21:30

## 2022-08-04 RX ADMIN — LOPERAMIDE HYDROCHLORIDE SCH MG: 2 CAPSULE ORAL at 13:18

## 2022-08-04 RX ADMIN — DOCUSATE SODIUM SCH MG: 100 CAPSULE ORAL at 21:00

## 2022-08-04 NOTE — OCCUPATIONAL THER DAILY NOTE
OT Current Status-Daily Note


Subjective


Pt alert, sitting in recliner.  Pt agrees to therapy.  No c/o pain.





Mental Status/Objective


Patient Orientation:  Person, Place, Time, Situation


Attachments:  IV





ADL-Treatment


Pt declines shower.  Pt independent with toileting, urination only.  Independent

with oral care and grooming standing at sink, independent stance no AD.


Therapy Code Descriptions/Definitions 





Functional [place] Measure:


0=Not Assessed/NA        4=Minimal Assistance


1=Total Assistance        5=Supervision or Setup


2=Maximal Assistance  6=Modified [place]


3=Moderate Assistance 7=Complete IndependenceSCALE: Activities may be completed 

with or without assistive devices.





6-Indepedent-patient completes the activity by him/herself with no assistance 

from a helper.


5-Set-up or Clean-up Assistance-helper sets up or cleans up; patient completes 

activity. [place] assists only prior to or  


    following the activity.


4-Supervision or Touching Assistance-helper provides verbal cues and/or 

touching/steadying and/or contact guard assistance as patient completes ac

tivity. Assistance may be provided   


    throughout the activity or intermittently.


3-Partial/Moderate Assistance-helper does LESS THAN HALF the effort. [place] 

lifts, holds or supports trunk or limbs, but provides less than half the effort.


2-Substantial/Maximal Assistance-helper does MORE THAN HALF the effort. [place] 

lifts or holds trunk or limbs and provides more than half the effort.


9-Xsoorttlh-qcvkvt does ALL the effort. Patient does none of the effort to 

complete the activity. Or, the assistance of 2 or more helpers is required for 

the patient to complete the  


    activity.


If activity was not attempted, code reason:


7-Patient Refused.


9-Not Applicable-not attempted and the patient did not perform the activity 

before the current illness, exacerbation or injury.


10-Not Attempted due to Environmental Limitations-(lack of equipment, weather 

restraints, etc.).


88-Not Attempted due to Medical Conditions or Safety Concerns.


Oral Hygiene (QC):  6


Toileting Hygiene (QC):  6 (voiding only)


Toilet Transfer (QC):  6





Other Treatment


Pt ambulated to therapy gym with FWW, PTA explained to MENENDEZ that pt is able to 

use SPC with therapies.  Pt completes B UE exercises to increase strength and 

coordination for daily functional tasks.  Wrist flex/ext/uln dev/rad dev using 

1# wt, 3 sets 10 reps.  Dowel janice exercise with 2# wt attached, 

bicep/tricep/shldr press 2 sets 15 reps.  Resistive clothes pins 1x with each 

hand.  Green (med/heavy resistance) theraband exercises completed, horizontal 

abd/add, shldr abd/add, shldr int/ext rotation 2 sets 10 reps.  After therapy, 

pt sitting in recliner with call light/phone in reach.  All needs met in room.





OT Short Term Goals


Short Term Goals


Time Frame:  Aug 5, 2022


Eatin


Oral hygiene:  6


Toileting hygiene:  5


Shower/bathe self:  4


Upper body dressin


Lower body dressin


Putting on/taking off footwear:  5 (shoes only)





OT Long Term Goals


Long Term Goals


Time Frame:  Aug 11, 2022


Eating (QC):  6


Oral Hygiene (QC):  6


Toileting Hygiene (QC):  5 (set up for colostomy)


Shower/Bathe Self (QC):  5


Upper Body Dressing (QC):  6


Lower Body Dressing (QC):  6


On/Off Footwear (QC):  6 (shoes only)


Add: Edema management to assist with use of L UE during ADLs- goal pt 

independent with HEP


1=Demonstrate adherence to instructed precautions during ADL tasks.


2=Patient will verbalize/demonstrate understanding of assistive 

devices/modifications for ADL.


3=Patient will improve strength/tolerance for activity to enable patient to 

perform ADL's.





OT Education/Plan


Problem List/Assessment


Assessment:  Decreased Activ Tolerance, Decreased UE Strength





Discharge Recommendations


Plan/Recommendations:  Continue POC





Treatment Plan/Plan of Care


Patient would benefit from OT for education, treatment and training to promote 

independence in ADL's, mobility, safety and/or upper extremity function for 

ADL's.


Plan of Care:  ADL Retraining, Caregiver Training, Cognitive Retraining, 

Concurrent Therapy, Functional Mobility, Group Exercise/Act as Ind, UE Funct 

Exercise/Act


Treatment Duration:  Aug 11, 2022


Frequency:  At least 5 of 7 days/Wk (IRF)


Estimated Hrs Per Day:  1.5 hours per day (75-90 min/day )


Agreement:  Yes


Rehab Potential:  Good





Time/GCodes


Start Time:  09:00


Stop Time:  10:30


Total Time Billed (hr/min):  90


Billed Treatment Time


1 visit-EX 4 (60 min)  ADL 2 (30 min)











MEG ANNA                Aug 4, 2022 10:20

## 2022-08-04 NOTE — PHYSICAL THERAPY DAILY NOTE
PT Daily Note-Current


Subjective


Pt. agrees to Rx, states he feels he didnt get enough sleep. Wants to progress 

to using cane.  Pt. is concerned that his colostomy bag is too full. Pt. resists

trying to assist in emptying it. This PTA encouraging pt. to learn this as he 

will be responsible for this at home.





Pain





   Location:  No Pain Reported





Mental Status


Patient Orientation:  Normal For Age


Attachments:  Colostomy/Ileostomy, IV





Transfers


SCALE: Activities may be completed with or without assistive devices.





6-Indepedent-patient completes the activity by him/herself with no assistance 

from a helper.


5-Set-up or Clean-up Assistance-helper sets up or cleans up; patient completes 

activity. [place] assists only prior to or  


    following the activity.


4-Supervision or Touching Assistance-helper provides verbal cues and/or 

touching/steadying and/or contact guard assistance as patient completes 

activity. Assistance may be provided   


    throughout the activity or intermittently.


3-Partial/Moderate Assistance-helper does LESS THAN HALF the effort. [place] 

lifts, holds or supports trunk or limbs, but provides less than half the effort.


2-Substantial/Maximal Assistance-helper does MORE THAN HALF the effort. [place] 

lifts or holds trunk or limbs and provides more than half the effort.


1-Xsavvdotq-cutuxg does ALL the effort. Patient does none of the effort to c

omplete the activity. Or, the assistance of 2 or more helpers is required for 

the patient to complete the  


    activity.


If activity was not attempted, code reason:


7-Patient Refused.


9-Not Applicable-not attempted and the patient did not perform the activity 

before the current illness, exacerbation or injury.


10-Not Attempted due to Environmental Limitations-(lack of equipment, weather 

restraints, etc.).


88-Not Attempted due to Medical Conditions or Safety Concerns.


Roll Left & Right (QC):  6


Sit to Lying (QC):  6


Lying to Sitting/Side of Bed(Q:  6


Sit to Stand (QC):  6


Chair/Bed-to-Chair Xfer(QC):  6





Weight Bearing


Full Weight Bearing


Full Weight Bearing





Gait Training


Does the Patient Walk?:  Yes


Walk 10 feet (QC):  4


Walk 50 ft with 2 Turns(QC):  4


Walk 150 ft (QC):  4


Gait Persons Needed:  1


Gait Assistive Device:  Cane Single Point


175 ft FWW, 50 ft and 125 ft SPC with CGA no LOB, equal step length. pt. 

instructed to use FWW when with nursing and cont to train on SPC with PT only





Exercises


Supine Ex:  Bridging, Ankle pumps, Rolling, Glut sets, Lower trunk rotation, 

Heel Slides, Short Arc Quads, Straight leg raise, Hip abd/add (sidelying)


Supine Reps:  15


Seated Therapy Exercises:  Ankle pumps, Sit to stand, Long arc quads, Hip 

flexion, Hip abd/add


Seated Reps:  12


NuStep Minutes:  8


 NuStep Workload:  1





Treatments


GT with SPC training, balance challenges derived from JABIER JOE ex, TRFs





Assessment


Current Status:  Good Progress





PT Short Term Goals


Short Term Goals


Time Frame:  Aug 8, 2022


Roll Left & Right:  6


Sit to lyin


Lying to sitting on side of be:  6


Sit to stand:  5


Chair/bed-to-chair transfer:  5


Walk 10 feet:  5


Walk 50 feet with two turns:  5


Walk 150 feet:  5





PT Long Term Goals


Long Term Goals


PT Long Term Goals Time Frame:  Aug 22, 2022


Roll Left & Right (QC):  6


Sit to Lying (QC):  6


Lying-Sitting on Side/Bed(QC):  6


Sit to Stand (QC):  6


Chair/Bed-to-Chair Xfer(QC):  6


Toilet Transfer (QC):  6


Car Transfer (QC):  6


Does the Patient Walk:  Yes


Walk 10 feet (QC):  6


Walk 50ft with 2 Turns (QC):  6


Walk 150 ft (QC):  6


Walking 10ft on Uneven Surface:  6


1 Step (curb) (QC):  6


4 Steps (QC):  6


12 Steps (QC):  6


Picking up an Object (QC):  6


Wheel 50 feet with 2 turns (QC:  9


Wheel 150 feet:  9





PT Plan


Treatment/Plan


Treatment Plan:  Continue Plan of Care


Treatment Plan:  Education, Functional Activity Foreign, Functional Strength, 

Group Therapy, Gait, Safety, Therapeutic Exercise, Transfers


Treatment Duration:  Aug 22, 2022


Frequency:  At least 5 of 7 days/Wk (IRF)


Estimated Hrs Per Day:  1.5 hours per day


Patient and/or Family Agrees t:  Yes





Safety Risks/Education


Patient Education:  Gait Training, Transfer Techniques, Correct Positioning, 

Disease Process, Safety Issues


Teaching Recipient:  Patient


Teaching Methods:  Demonstration, Discussion


Response to Teaching:  Verbalize Understanding, Return Demonstration, [place]

nforcement Needed





Time/GCodes


Time In:  800


Time Out:  900


Total Billed Treatment Time:  60


Total Billed Treatment


1,GT30m,EX30m











JACQUI JOLLY PTA              Aug 4, 2022 09:00

## 2022-08-04 NOTE — PM&R PROGRESS NOTE
Subjective


HPI/CC On Admission


Date Seen by Provider:  Aug 4, 2022


Time Seen by Provider:  12:45


Subjective/Events-last exam


8/4/2022:


Patient doing well


HLIVF after this liter is complete


Update patient on the O2 at night recs by Dr Barrera


Cognition will need to be monitored








8/3/2022:


Doing well


Increased output from ileostomy 700+ so will initiate IVF


[facility] had delayed DC due to high output


Improved otherwise








8/2/2022:


Doing better


Ileostomy functioning well


Gaining strength


Slept better last night


Labs reviewed


No pain





Review of Systems


General:  Fatigue, Malaise





Objective


Exam


Vital Signs





Vital Signs








  Date Time  Temp Pulse Resp B/P (MAP) Pulse Ox O2 Delivery O2 Flow Rate FiO2


 


8/4/22 09:00      Room Air  


 


8/4/22 07:48 36.5 74 16 129/62 (84) 94   





Capillary Refill :


General Appearance:  No Apparent Distress, WD/WN, Chronically ill


HEENT:  PERRL/EOMI, Normal ENT Inspection, Pharynx Normal


Neck:  Full Range of Motion, Normal Inspection, Non Tender, Supple, Carotid 

Bruit


Respiratory:  Chest Non Tender, Lungs Clear, Normal Breath Sounds, No Accessory 

Muscle Use, No Respiratory Distress


Cardiovascular:  Regular Rate, Rhythm, No Edema, No Gallop, No JVD, No Murmur, 

Normal Peripheral Pulses


Gastrointestinal:  Normal Bowel Sounds, No Organomegaly, No Pulsatile Mass, Non 

Tender, Soft


Back:  Normal Inspection, No CVA Tenderness, No Vertebral Tenderness


Extremity:  Normal Capillary Refill, Normal Inspection, Normal Range of Motion, 

Non Tender, No Calf Tenderness, No Pedal Edema


Neurologic/Psychiatric:  Alert, Oriented x3, CNs II-XII Norm as Tested, Abnormal

Gait, Depressed Affect, Motor Weakness (generalized)


Skin:  Normal Color, Warm/Dry


Lymphatic:  No Adenopathy





Results/Procedures


Lab


Laboratory Tests


8/4/22 06:17








Patient resulted labs reviewed.





FIM


Transfers


Therapy Code Descriptions/Definitions 





Functional [place] Measure:


0=Not Assessed/NA        4=Minimal Assistance


1=Total Assistance        5=Supervision or Setup


2=Maximal Assistance  6=Modified [place]


3=Moderate Assistance 7=Complete IndependenceSCALE: Activities may be completed 

with or without assistive devices.





6-Indepedent-patient completes the activity by him/herself with no assistance 

from a helper.


5-Set-up or Clean-up Assistance-helper sets up or cleans up; patient completes 

activity. [place] assists only prior to or  


    following the activity.


4-Supervision or Touching Assistance-helper provides verbal cues and/or to

uching/steadying and/or contact guard assistance as patient completes activity. 

Assistance may be provided   


    throughout the activity or intermittently.


3-Partial/Moderate Assistance-helper does LESS THAN HALF the effort. [place] 

lifts, holds or supports trunk or limbs, but provides less than half the effort.


2-Substantial/Maximal Assistance-helper does MORE THAN HALF the effort. [place] 

lifts or holds trunk or limbs and provides more than half the effort.


9-Txjahyulg-uvseuz does ALL the effort. Patient does none of the effort to 

complete the activity. Or, the assistance of 2 or more helpers is required for 

the patient to complete the  


    activity.


If activity was not attempted, code reason:


7-Patient Refused.


9-Not Applicable-not attempted and the patient did not perform the activity 

before the current illness, exacerbation or injury.


10-Not Attempted due to Environmental Limitations-(lack of equipment, weather 

restraints, etc.).


88-Not Attempted due to Medical Conditions or Safety Concerns.


Roll Left to Right (QC):  6


Sit to Lying (QC):  6


Sit to Stand (QC):  6


Chair/Bed-to-Chair Xfer(QC):  6


Car Transfer (QC):  6





Gait Training


Does the Patient Walk?:  Yes


Distance:  250', 150'


Walk 10 feet (QC):  6


Walk 50 ft with 2 Turns(QC):  6


Walk 150 ft (QC):  6


Walking 10ft/uneven surface-QC:  4


Gait Persons Needed:  1


Gait Assistive Device:  FWW





Wheelchair Training


Does the Pt Use a Wheelchair?:  No


Wheel 50 ft with 2 turns (QC):  9


Wheel 150 ft (QC):  9





Stair Training


 Stair Training: Handrails/:  2 handrails


#of Steps:  4


1 Step (curb) (QC):  4


4 Steps (QC):  4


12 Steps (QC):  88


Stairs:  Pattern:  Reciprocal





Balance


Picking up an Object (QC):  4 (SBA with reacher)





ADL-Treatment


Eating (QC):  6 (Per clinical judgment.)


Oral Hygiene (QC):  6


Shower/Bathe Self (QC):  4


Upper Body Dressing (QC):  6


Lower Body Dressing (QC):  4


On/Off Footwear (QC):  6 (shoes only)


Toileting Hygiene (QC):  6 (Urination only. Tall toilet, grab bar)


Toilet Transfer (QC):  6 (Tall toilet, grab bar)





Assessment/Plan


Assessment and Plan


Assess & Plan/Chief Complaint


Assessment:


Myopathy


Rectal adenocarcinoma resection with ileostomy


Long haosmin COVID symptoms


Weight loss 45 pounds


BPH


Anemia


Ileostomy high output needing IVF 8/3/22


Nocturnal hypoxia per sleep study Dr Barrera





Plan:


Monitor closely


Home meds


IRF protocol





8/2/2022:


Monitor fluid intake


Lomotil and Imodium








8/3/2022:


IVF


Monitor closely





8/4/2022:


IVF heplock after this liter done





(1) Colon cancer


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


(3) Weight loss


(4) Advanced age


(5) Weakness


Status:  Acute











LUCIAN SAEED DO                 Aug 4, 2022 05:43

## 2022-08-04 NOTE — PHYSICAL THERAPY DAILY NOTE
PT Daily Note-Current


Subjective


Pt. agrees to Rx. States he uses a lift recline chair at home bc his brother 

 and left it to him.





Pain





   Numeric Pain Scale:  3


   Location:  Left


   Location Body Site:  Arm (forearm)


   Pain Description:  Burning


   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





Mental Status


Patient Orientation:  Normal For Age


Attachments:  IV





Transfers


SCALE: Activities may be completed with or without assistive devices.





6-Indepedent-patient completes the activity by him/herself with no assistance 

from a helper.


5-Set-up or Clean-up Assistance-helper sets up or cleans up; patient completes 

activity. [place] assists only prior to or  


    following the activity.


4-Supervision or Touching Assistance-helper provides verbal cues and/or 

touching/steadying and/or contact guard assistance as patient completes 

activity. Assistance may be provided   


    throughout the activity or intermittently.


3-Partial/Moderate Assistance-helper does LESS THAN HALF the effort. [place] lift

s, holds or supports trunk or limbs, but provides less than half the effort.


2-Substantial/Maximal Assistance-helper does MORE THAN HALF the effort. [place] 

lifts or holds trunk or limbs and provides more than half the effort.


6-Kmxbnlkcf-jdblxd does ALL the effort. Patient does none of the effort to 

complete the activity. Or, the assistance of 2 or more helpers is required for 

the patient to complete the  


    activity.


If activity was not attempted, code reason:


7-Patient Refused.


9-Not Applicable-not attempted and the patient did not perform the activity 

before the current illness, exacerbation or injury.


10-Not Attempted due to Environmental Limitations-(lack of equipment, weather 

restraints, etc.).


88-Not Attempted due to Medical Conditions or Safety Concerns.


sit to stands all SBA to Mod I





Weight Bearing


Full Weight Bearing


Full Weight Bearing





Gait Training


Does the Patient Walk?:  Yes


Walk 150 ft (QC):  4


Gait Persons Needed:  1


Gait Assistive Device:  Cane Single Point


pts own SPC here, ambulated 250 ft x 2 no LOB, assisted for IV





Exercises


Seated Therapy Exercises:  Ankle pumps, Sit to stand, Long arc quads, Hip 

flexion, Hip abd/add


Seated Reps:  12





Treatments


gait with SPC, sit to stands, review of safety





Assessment


Current Status:  Good Progress





PT Short Term Goals


Short Term Goals


Time Frame:  Aug 8, 2022


Roll Left & Right:  6


Sit to lyin


Lying to sitting on side of be:  6


Sit to stand:  5


Chair/bed-to-chair transfer:  5


Walk 10 feet:  5


Walk 50 feet with two turns:  5


Walk 150 feet:  5





PT Long Term Goals


Long Term Goals


PT Long Term Goals Time Frame:  Aug 22, 2022


Roll Left & Right (QC):  6


Sit to Lying (QC):  6


Lying-Sitting on Side/Bed(QC):  6


Sit to Stand (QC):  6


Chair/Bed-to-Chair Xfer(QC):  6


Toilet Transfer (QC):  6


Car Transfer (QC):  6


Does the Patient Walk:  Yes


Walk 10 feet (QC):  6


Walk 50ft with 2 Turns (QC):  6


Walk 150 ft (QC):  6


Walking 10ft on Uneven Surface:  6


1 Step (curb) (QC):  6


4 Steps (QC):  6


12 Steps (QC):  6


Picking up an Object (QC):  6


Wheel 50 feet with 2 turns (QC:  9


Wheel 150 feet:  9





PT Plan


Treatment/Plan


Treatment Plan:  Continue Plan of Care


Treatment Plan:  Education, Functional Activity Foreign, Functional Strength, 

Group Therapy, Gait, Safety, Therapeutic Exercise, Transfers


Treatment Duration:  Aug 22, 2022


Frequency:  At least 5 of 7 days/Wk (IRF)


Estimated Hrs Per Day:  1.5 hours per day


Patient and/or Family Agrees t:  Yes





Safety Risks/Education


Patient Education:  Gait Training, Transfer Techniques


Teaching Recipient:  Patient


Teaching Methods:  Demonstration, Discussion


Response to Teaching:  Verbalize Understanding, Return Demonstration, 

Reinforcement Needed





Time/GCodes


Time In:  1100


Time Out:  1130


Total Billed Treatment Time:  30


Total Billed Treatment


1,GT20m,FA10m











JACQUI JOLLY PTA              Aug 4, 2022 11:29

## 2022-08-05 VITALS — SYSTOLIC BLOOD PRESSURE: 124 MMHG | DIASTOLIC BLOOD PRESSURE: 59 MMHG

## 2022-08-05 VITALS — DIASTOLIC BLOOD PRESSURE: 61 MMHG | SYSTOLIC BLOOD PRESSURE: 134 MMHG

## 2022-08-05 RX ADMIN — PANTOPRAZOLE SODIUM SCH MG: 40 TABLET, DELAYED RELEASE ORAL at 09:24

## 2022-08-05 RX ADMIN — OXYCODONE HYDROCHLORIDE AND ACETAMINOPHEN SCH MG: 500 TABLET ORAL at 09:24

## 2022-08-05 RX ADMIN — DOCUSATE SODIUM SCH MG: 100 CAPSULE ORAL at 10:42

## 2022-08-05 RX ADMIN — DIPHENOXYLATE HYDROCHLORIDE AND ATROPINE SULFATE SCH EA: 2.5; .025 TABLET ORAL at 20:44

## 2022-08-05 RX ADMIN — METOPROLOL SUCCINATE SCH MG: 100 TABLET, EXTENDED RELEASE ORAL at 09:24

## 2022-08-05 RX ADMIN — DOCUSATE SODIUM SCH MG: 100 CAPSULE ORAL at 21:21

## 2022-08-05 RX ADMIN — ASPIRIN SCH MG: 81 TABLET ORAL at 09:24

## 2022-08-05 RX ADMIN — DOCUSATE SODIUM AND SENNOSIDES SCH EA: 8.6; 5 TABLET, FILM COATED ORAL at 21:22

## 2022-08-05 RX ADMIN — DIPHENHYDRAMINE HYDROCHLORIDE, ZINC ACETATE PRN GM: 2; .1 CREAM TOPICAL at 20:46

## 2022-08-05 RX ADMIN — ROSUVASTATIN CALCIUM SCH MG: 20 TABLET, FILM COATED ORAL at 09:24

## 2022-08-05 RX ADMIN — LOPERAMIDE HYDROCHLORIDE SCH MG: 2 CAPSULE ORAL at 09:24

## 2022-08-05 RX ADMIN — LOPERAMIDE HYDROCHLORIDE SCH MG: 2 CAPSULE ORAL at 17:23

## 2022-08-05 RX ADMIN — LOPERAMIDE HYDROCHLORIDE SCH MG: 2 CAPSULE ORAL at 12:45

## 2022-08-05 RX ADMIN — OMEGA-3 FATTY ACIDS CAP 1000 MG SCH MG: 1000 CAP at 20:44

## 2022-08-05 RX ADMIN — TAMSULOSIN HYDROCHLORIDE SCH MG: 0.4 CAPSULE ORAL at 09:23

## 2022-08-05 RX ADMIN — Medication SCH CAP: at 10:43

## 2022-08-05 RX ADMIN — DOCUSATE SODIUM AND SENNOSIDES SCH EA: 8.6; 5 TABLET, FILM COATED ORAL at 10:43

## 2022-08-05 RX ADMIN — LORATADINE SCH MG: 10 TABLET ORAL at 09:24

## 2022-08-05 RX ADMIN — Medication SCH MCG: at 09:24

## 2022-08-05 RX ADMIN — BACITRACIN SCH EACH: 500 OINTMENT TOPICAL at 20:45

## 2022-08-05 RX ADMIN — POLYETHYLENE GLYCOL (3350) SCH GM: 17 POWDER, FOR SOLUTION ORAL at 21:21

## 2022-08-05 RX ADMIN — FERROUS SULFATE TAB 325 MG (65 MG ELEMENTAL FE) SCH MG: 325 (65 FE) TAB at 09:24

## 2022-08-05 RX ADMIN — DIPHENOXYLATE HYDROCHLORIDE AND ATROPINE SULFATE SCH EA: 2.5; .025 TABLET ORAL at 09:24

## 2022-08-05 RX ADMIN — OMEGA-3 FATTY ACIDS CAP 1000 MG SCH MG: 1000 CAP at 09:24

## 2022-08-05 RX ADMIN — BACITRACIN SCH EACH: 500 OINTMENT TOPICAL at 09:25

## 2022-08-05 RX ADMIN — SODIUM CHLORIDE SCH MLS/HR: 900 INJECTION, SOLUTION INTRAVENOUS at 01:30

## 2022-08-05 RX ADMIN — DIPHENHYDRAMINE HYDROCHLORIDE, ZINC ACETATE PRN GM: 2; .1 CREAM TOPICAL at 18:15

## 2022-08-05 RX ADMIN — LIDOCAINE SCH EA: 50 PATCH CUTANEOUS at 09:25

## 2022-08-05 RX ADMIN — Medication SCH EA: at 06:19

## 2022-08-05 RX ADMIN — LOPERAMIDE HYDROCHLORIDE SCH MG: 2 CAPSULE ORAL at 20:45

## 2022-08-05 RX ADMIN — MONTELUKAST SCH MG: 10 TABLET, FILM COATED ORAL at 20:45

## 2022-08-05 RX ADMIN — POLYETHYLENE GLYCOL (3350) SCH GM: 17 POWDER, FOR SOLUTION ORAL at 10:42

## 2022-08-05 NOTE — OCCUPATIONAL THER DAILY NOTE
OT Current Status-Daily Note


Subjective


Pt alert, sitting in recliner.  Pt agrees to therapy.  No c/o pain.





Mental Status/Objective


Patient Orientation:  Person, Place, Time, Situation


Attachments:  Colostomy/Ileostomy, IV





ADL-Treatment


Pt agrees to shower.  Pt independent with shower.  Independent with upper/lower 

body dressing.  Pt able to doff sock by self with AE, wife dons socks(baseline).

 Pt independent standing at sink to complete own oral care and grooming.  

Independent with voiding.  Pt and wife continue to be educated on completing 

ostomy care by wound care.  After therapy, pt sitting in recliner with call 

light/phone in reach.  All needs met.


Therapy Code Descriptions/Definitions 





Functional [place] Measure:


0=Not Assessed/NA        4=Minimal Assistance


1=Total Assistance        5=Supervision or Setup


2=Maximal Assistance  6=Modified [place]


3=Moderate Assistance 7=Complete IndependenceSCALE: Activities may be completed 

with or without assistive devices.





6-Indepedent-patient completes the activity by him/herself with no assistance 

from a helper.


5-Set-up or Clean-up Assistance-helper sets up or cleans up; patient completes 

activity. [place] assists only prior to or  


    following the activity.


4-Supervision or Touching Assistance-helper provides verbal cues and/or 

touching/steadying and/or contact guard assistance as patient completes 

activity. Assistance may be provided   


    throughout the activity or intermittently.


3-Partial/Moderate Assistance-helper does LESS THAN HALF the effort. [place] 

lifts, holds or supports trunk or limbs, but provides less than half the effort.


2-Substantial/Maximal Assistance-helper does MORE THAN HALF the effort. [place] 

lifts or holds trunk or limbs and provides more than half the effort.


3-Nwpvbuebl-qydptk does ALL the effort. Patient does none of the effort to 

complete the activity. Or, the assistance of 2 or more helpers is required for 

the patient to complete the  


    activity.


If activity was not attempted, code reason:


7-Patient Refused.


9-Not Applicable-not attempted and the patient did not perform the activity be

fore the current illness, exacerbation or injury.


10-Not Attempted due to Environmental Limitations-(lack of equipment, weather 

restraints, etc.).


88-Not Attempted due to Medical Conditions or Safety Concerns.


Eating (QC):  6


Oral Hygiene (QC):  6


Shower/Bathe Self (QC):  6


Upper Body Dressing (QC):  6


Lower Body Dressing (QC):  6


On/Off Footwear:  3


Toileting Hygiene (QC):  6 (voiding only)


Toilet Transfer (QC):  6





OT Short Term Goals


Short Term Goals


Time Frame:  Aug 5, 2022


Eatin


Oral hygiene:  6


Toileting hygiene:  5


Shower/bathe self:  4


Upper body dressin


Lower body dressin


Putting on/taking off footwear:  5 (shoes only)





OT Long Term Goals


Long Term Goals


Time Frame:  Aug 11, 2022


Eating (QC):  6


Oral Hygiene (QC):  6


Toileting Hygiene (QC):  5 (set up for colostomy)


Shower/Bathe Self (QC):  5


Upper Body Dressing (QC):  6


Lower Body Dressing (QC):  6


On/Off Footwear (QC):  6 (shoes only)


Add: Edema management to assist with use of L UE during ADLs- goal pt 

independent with HEP


1=Demonstrate adherence to instructed precautions during ADL tasks.


2=Patient will verbalize/demonstrate understanding of assistive 

devices/modifications for ADL.


3=Patient will improve strength/tolerance for activity to enable patient to 

perform ADL's.





OT Education/Plan


Problem List/Assessment


Assessment:  Impaired Self-Care Skills





Discharge Recommendations


Plan/Recommendations:  Continue POC





Treatment Plan/Plan of Care


Patient would benefit from OT for education, treatment and training to promote 

independence in ADL's, mobility, safety and/or upper extremity function for 

ADL's.


Plan of Care:  ADL Retraining, Caregiver Training, Cognitive Retraining, 

Concurrent Therapy, Functional Mobility, Group Exercise/Act as Ind, UE Funct 

Exercise/Act


Treatment Duration:  Aug 11, 2022


Frequency:  At least 5 of 7 days/Wk (IRF)


Estimated Hrs Per Day:  1.5 hours per day (75-90 min/day )


Agreement:  Yes


Rehab Potential:  Good





Time/GCodes


Start Time:  09:00


Stop Time:  10:15


Total Time Billed (hr/min):  75


Billed Treatment Time


1 visit-ADL 5 (75 min)











MEG ANNA                Aug 5, 2022 09:59

## 2022-08-05 NOTE — PM&R PROGRESS NOTE
Subjective


HPI/CC On Admission


Date Seen by Provider:  Aug 5, 2022


Time Seen by Provider:  12:30


Subjective/Events-last exam


8/5/2022:


Pt is doing a lot better


Discharge on Monday


Rash on back will require Benadryl cream


Ostomy education went really well


100 CC output from ileostomy and 350 last night


Will need IV fluids three times weekly to prevent dehydration








8/4/2022:


Patient doing well


HLIVF after this liter is complete


Update patient on the O2 at night recs by Dr Barrera


Cognition will need to be monitored








8/3/2022:


Doing well


Increased output from ileostomy 700+ so will initiate IVF


[facility] had delayed DC due to high output


Improved otherwise








8/2/2022:


Doing better


Ileostomy functioning well


Gaining strength


Slept better last night


Labs reviewed


No pain





Review of Systems


General:  Fatigue, Malaise





Objective


Exam


Vital Signs





Vital Signs








  Date Time  Temp Pulse Resp B/P (MAP) Pulse Ox O2 Delivery O2 Flow Rate FiO2


 


8/5/22 09:00      Room Air  


 


8/5/22 07:42 36.4 76 18 134/61 (85) 94   





Capillary Refill :


General Appearance:  No Apparent Distress, WD/WN, Chronically ill


HEENT:  PERRL/EOMI, Normal ENT Inspection, Pharynx Normal


Neck:  Full Range of Motion, Normal Inspection, Non Tender, Supple, Carotid 

Bruit


Respiratory:  Chest Non Tender, Lungs Clear, Normal Breath Sounds, No Accessory 

Muscle Use, No Respiratory Distress


Cardiovascular:  Regular Rate, Rhythm, No Edema, No Gallop, No JVD, No Murmur, 

Normal Peripheral Pulses


Gastrointestinal:  Normal Bowel Sounds, No Organomegaly, No Pulsatile Mass, Non 

Tender, Soft


Back:  Normal Inspection, No CVA Tenderness, No Vertebral Tenderness


Extremity:  Normal Capillary Refill, Normal Inspection, Normal Range of Motion, 

Non Tender, No Calf Tenderness, No Pedal Edema


Neurologic/Psychiatric:  Alert, Oriented x3, CNs II-XII Norm as Tested, Abnormal

Gait, Depressed Affect, Motor Weakness (generalized)


Skin:  Normal Color, Warm/Dry


Lymphatic:  No Adenopathy





Results/Procedures


Lab


Patient resulted labs reviewed.





FIM


Transfers


Therapy Code Descriptions/Definitions 





Functional [place] Measure:


0=Not Assessed/NA        4=Minimal Assistance


1=Total Assistance        5=Supervision or Setup


2=Maximal Assistance  6=Modified [place]


3=Moderate Assistance 7=Complete IndependenceSCALE: Activities may be completed 

with or without assistive devices.





6-Indepedent-patient completes the activity by him/herself with no assistance 

from a helper.


5-Set-up or Clean-up Assistance-helper sets up or cleans up; patient completes 

activity. [place] assists only prior to or  


    following the activity.


4-Supervision or Touching Assistance-helper provides verbal cues and/or to

uching/steadying and/or contact guard assistance as patient completes activity. 

Assistance may be provided   


    throughout the activity or intermittently.


3-Partial/Moderate Assistance-helper does LESS THAN HALF the effort. [place] 

lifts, holds or supports trunk or limbs, but provides less than half the effort.


2-Substantial/Maximal Assistance-helper does MORE THAN HALF the effort. [place] 

lifts or holds trunk or limbs and provides more than half the effort.


6-Fjqyjmnln-evepre does ALL the effort. Patient does none of the effort to 

complete the activity. Or, the assistance of 2 or more helpers is required for 

the patient to complete the  


    activity.


If activity was not attempted, code reason:


7-Patient Refused.


9-Not Applicable-not attempted and the patient did not perform the activity 

before the current illness, exacerbation or injury.


10-Not Attempted due to Environmental Limitations-(lack of equipment, weather 

restraints, etc.).


88-Not Attempted due to Medical Conditions or Safety Concerns.


Roll Left to Right (QC):  6


Sit to Lying (QC):  6


Sit to Stand (QC):  6


Chair/Bed-to-Chair Xfer(QC):  6


Car Transfer (QC):  6





Gait Training


Does the Patient Walk?:  Yes


Distance:  250', 150'


Walk 10 feet (QC):  4


Walk 50 ft with 2 Turns(QC):  4


Walk 150 ft (QC):  4


Walking 10ft/uneven surface-QC:  4


Gait Persons Needed:  1


Gait Assistive Device:  Cane Single Point





Wheelchair Training


Does the Pt Use a Wheelchair?:  No


Wheel 50 ft with 2 turns (QC):  9


Wheel 150 ft (QC):  9





Stair Training


 Stair Training: Handrails/:  2 handrails


#of Steps:  4


1 Step (curb) (QC):  4


4 Steps (QC):  4


12 Steps (QC):  88


Stairs:  Pattern:  Reciprocal





Balance


Picking up an Object (QC):  4 (SBA with reacher)





ADL-Treatment


Eating (QC):  6 (Per clinical judgment.)


Oral Hygiene (QC):  6


Shower/Bathe Self (QC):  4


Upper Body Dressing (QC):  6


Lower Body Dressing (QC):  4


On/Off Footwear (QC):  6 (shoes only)


Toileting Hygiene (QC):  6 (voiding only)


Toilet Transfer (QC):  6





Assessment/Plan


Assessment and Plan


Assess & Plan/Chief Complaint


Assessment:


Myopathy


Rectal adenocarcinoma resection with ileostomy


Long hauler COVID symptoms


Weight loss 45 pounds


BPH


Anemia


Ileostomy high output needing IVF 8/3/22


Nocturnal hypoxia per sleep study Dr Barrera





Plan:


Monitor closely


Home meds


IRF protocol





8/2/2022:


Monitor fluid intake


Lomotil and Imodium








8/3/2022:


IVF


Monitor closely





8/4/2022:


IVF heplock after this liter done





8/5/2022:


IVF tomorrow


Keep PICC at DC for IVF three times a week for a few weeks





(1) Colon cancer


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


(3) Weight loss


(4) Advanced age


(5) Weakness


Status:  Acute











LUCIAN SAEED DO                 Aug 5, 2022 06:31

## 2022-08-05 NOTE — PHYSICAL THERAPY DAILY NOTE
PT Daily Note-Current


Subjective


Pt. agrees to Rx and feels he is doing much better and is ready to go home.





Pain





   Location:  No Pain Reported





Mental Status


Patient Orientation:  Normal For Age


Attachments:  Colostomy/Ileostomy





Transfers


SCALE: Activities may be completed with or without assistive devices.





6-Indepedent-patient completes the activity by him/herself with no assistance 

from a helper.


5-Set-up or Clean-up Assistance-helper sets up or cleans up; patient completes 

activity. [place] assists only prior to or  


    following the activity.


4-Supervision or Touching Assistance-helper provides verbal cues and/or 

touching/steadying and/or contact guard assistance as patient completes 

activity. Assistance may be provided   


    throughout the activity or intermittently.


3-Partial/Moderate Assistance-helper does LESS THAN HALF the effort. [place] 

lifts, holds or supports trunk or limbs, but provides less than half the effort.


2-Substantial/Maximal Assistance-helper does MORE THAN HALF the effort. [place] 

lifts or holds trunk or limbs and provides more than half the effort.


1-Gusjaadvm-qyxxgc does ALL the effort. Patient does none of the effort to 

complete the activity. Or, the assistance of 2 or more helpers is required for 

the patient to complete the  


    activity.


If activity was not attempted, code reason:


7-Patient Refused.


9-Not Applicable-not attempted and the patient did not perform the activity 

before the current illness, exacerbation or injury.


10-Not Attempted due to Environmental Limitations-(lack of equipment, weather 

restraints, etc.).


88-Not Attempted due to Medical Conditions or Safety Concerns.


Roll Left & Right (QC):  6


Sit to Lying (QC):  6


Lying to Sitting/Side of Bed(Q:  6


Sit to Stand (QC):  6


Chair/Bed-to-Chair Xfer(QC):  6


Toilet Transfer (QC):  6


Car Transfer (QC):  6





Weight Bearing


Full Weight Bearing


Full Weight Bearing





Gait Training


Does the Patient Walk?:  Yes


Walk 10 feet (QC):  6


Walk 50 ft with 2 Turns(QC):  6


Walk 150 ft (QC):  6


Gait Assistive Device:  Cane Single Point


250 ft x 3, side step, retro step all no LOB using SPC.





Stair Training


 Stair Training: Handrails/:  2 handrails


#of Steps:  8


4 Steps (QC):  6


Stairs:  Pattern:  Reciprocal





Balance


Picking up an Object (QC):  6





Exercises


Seated Therapy Exercises:  Ankle pumps, Sit to stand, Long arc quads, Hip 

flexion, Hip abd/add


Seated Reps:  20


NuStep Minutes:  10


 NuStep Workload:  2





Assessment


Current Status:  Good Progress


meets goals





PT Short Term Goals


Short Term Goals


Time Frame:  Aug 8, 2022


Roll Left & Right:  6


Sit to lyin


Lying to sitting on side of be:  6


Sit to stand:  5


Chair/bed-to-chair transfer:  5


Walk 10 feet:  5


Walk 50 feet with two turns:  5


Walk 150 feet:  5





PT Long Term Goals


Long Term Goals


PT Long Term Goals Time Frame:  Aug 22, 2022


Roll Left & Right (QC):  6


Sit to Lying (QC):  6


Lying-Sitting on Side/Bed(QC):  6


Sit to Stand (QC):  6


Chair/Bed-to-Chair Xfer(QC):  6


Toilet Transfer (QC):  6


Car Transfer (QC):  6


Does the Patient Walk:  Yes


Walk 10 feet (QC):  6


Walk 50ft with 2 Turns (QC):  6


Walk 150 ft (QC):  6


Walking 10ft on Uneven Surface:  6


1 Step (curb) (QC):  6


4 Steps (QC):  6


12 Steps (QC):  6


Picking up an Object (QC):  6


Wheel 50 feet with 2 turns (QC:  9


Wheel 150 feet:  9





PT Plan


Treatment/Plan


Treatment Plan:  Continue Plan of Care


Treatment Plan:  Education, Functional Activity Foreign, Functional Strength, 

Group Therapy, Gait, Safety, Therapeutic Exercise, Transfers


Treatment Duration:  Aug 22, 2022


Frequency:  At least 5 of 7 days/Wk (IRF)


Estimated Hrs Per Day:  1.5 hours per day


Patient and/or Family Agrees t:  Yes





Safety Risks/Education


Patient Education:  Gait Training, Transfer Techniques, Steps, Correct Positio

rodrigo, Disease Process, Safety Issues


Teaching Recipient:  Patient


Teaching Methods:  Demonstration, Discussion


Response to Teaching:  Verbalize Understanding, Return Demonstration, 

Reinforcement Needed





Time/GCodes


Time In:  800


Time Out:  900


Total Billed Treatment Time:  60


Total Billed Treatment


1,GT20m,EX30m,FA10m











JACQUI JOLLY PTA              Aug 5, 2022 08:59

## 2022-08-05 NOTE — THERAPY GROUP DAILY NOTE
Therapy Daily Group Note


Patient Education Topic


Home Safety





Exercises


Sit to/from Stand





Session


Ratio (pt:therapist):  4:1


Goal of Session:  Education on ARU Expectations, Home Safety Strategies


Goal Met for this Session:  Yes


Pt Benefit of Group:  Contributions to Others, F/U Use of Strategies @Home, 

Increased Functional Safety, Socialization





Other/Notes


Pt participated in group PT OT session this date . Pt. ambulated from room to 

gym and back as well as from seat to activity board during group.  Pt. 

introduced self and shared appropriately with group . Pts wife jined him for 

group session.  Education regarding purpose and goals of ARU were explained, 

Home safety spanning many different topic was covered with pts sharing their 

strategies for home safety. Pts participated in memory activity matching images 

from memory. Pt. in room in chair after Rx bell at hand, needs met


Start Time:  13:00


Stop Time:  14:15


Total Billed Treatment Time:  75


Total Billed Treatment


1,GRP











JACQUI JOLLY PTA              Aug 5, 2022 14:34

## 2022-08-06 VITALS — SYSTOLIC BLOOD PRESSURE: 125 MMHG | DIASTOLIC BLOOD PRESSURE: 60 MMHG

## 2022-08-06 VITALS — SYSTOLIC BLOOD PRESSURE: 130 MMHG | DIASTOLIC BLOOD PRESSURE: 58 MMHG

## 2022-08-06 RX ADMIN — OMEGA-3 FATTY ACIDS CAP 1000 MG SCH MG: 1000 CAP at 20:30

## 2022-08-06 RX ADMIN — POLYETHYLENE GLYCOL (3350) SCH GM: 17 POWDER, FOR SOLUTION ORAL at 08:54

## 2022-08-06 RX ADMIN — Medication SCH CAP: at 09:06

## 2022-08-06 RX ADMIN — Medication SCH MCG: at 08:48

## 2022-08-06 RX ADMIN — TAMSULOSIN HYDROCHLORIDE SCH MG: 0.4 CAPSULE ORAL at 08:49

## 2022-08-06 RX ADMIN — POLYETHYLENE GLYCOL (3350) SCH GM: 17 POWDER, FOR SOLUTION ORAL at 20:40

## 2022-08-06 RX ADMIN — LOPERAMIDE HYDROCHLORIDE SCH MG: 2 CAPSULE ORAL at 09:15

## 2022-08-06 RX ADMIN — LOPERAMIDE HYDROCHLORIDE SCH MG: 2 CAPSULE ORAL at 14:20

## 2022-08-06 RX ADMIN — DOCUSATE SODIUM AND SENNOSIDES SCH EA: 8.6; 5 TABLET, FILM COATED ORAL at 08:54

## 2022-08-06 RX ADMIN — DIPHENHYDRAMINE HYDROCHLORIDE, ZINC ACETATE PRN GM: 2; .1 CREAM TOPICAL at 20:31

## 2022-08-06 RX ADMIN — FERROUS SULFATE TAB 325 MG (65 MG ELEMENTAL FE) SCH MG: 325 (65 FE) TAB at 08:49

## 2022-08-06 RX ADMIN — Medication SCH EA: at 06:41

## 2022-08-06 RX ADMIN — MONTELUKAST SCH MG: 10 TABLET, FILM COATED ORAL at 20:30

## 2022-08-06 RX ADMIN — DIPHENOXYLATE HYDROCHLORIDE AND ATROPINE SULFATE SCH EA: 2.5; .025 TABLET ORAL at 09:15

## 2022-08-06 RX ADMIN — LOPERAMIDE HYDROCHLORIDE SCH MG: 2 CAPSULE ORAL at 20:30

## 2022-08-06 RX ADMIN — DIPHENHYDRAMINE HYDROCHLORIDE, ZINC ACETATE PRN GM: 2; .1 CREAM TOPICAL at 06:45

## 2022-08-06 RX ADMIN — LIDOCAINE SCH EA: 50 PATCH CUTANEOUS at 08:49

## 2022-08-06 RX ADMIN — ASPIRIN SCH MG: 81 TABLET ORAL at 08:49

## 2022-08-06 RX ADMIN — LOPERAMIDE HYDROCHLORIDE SCH MG: 2 CAPSULE ORAL at 17:15

## 2022-08-06 RX ADMIN — DOCUSATE SODIUM SCH MG: 100 CAPSULE ORAL at 08:54

## 2022-08-06 RX ADMIN — DIPHENOXYLATE HYDROCHLORIDE AND ATROPINE SULFATE SCH EA: 2.5; .025 TABLET ORAL at 20:30

## 2022-08-06 RX ADMIN — DOCUSATE SODIUM AND SENNOSIDES SCH EA: 8.6; 5 TABLET, FILM COATED ORAL at 20:40

## 2022-08-06 RX ADMIN — DOCUSATE SODIUM SCH MG: 100 CAPSULE ORAL at 20:40

## 2022-08-06 RX ADMIN — DIPHENHYDRAMINE HYDROCHLORIDE, ZINC ACETATE PRN GM: 2; .1 CREAM TOPICAL at 08:50

## 2022-08-06 RX ADMIN — PANTOPRAZOLE SODIUM SCH MG: 40 TABLET, DELAYED RELEASE ORAL at 08:49

## 2022-08-06 RX ADMIN — BACITRACIN SCH EACH: 500 OINTMENT TOPICAL at 20:30

## 2022-08-06 RX ADMIN — LORATADINE SCH MG: 10 TABLET ORAL at 08:49

## 2022-08-06 RX ADMIN — OXYCODONE HYDROCHLORIDE AND ACETAMINOPHEN SCH MG: 500 TABLET ORAL at 08:49

## 2022-08-06 RX ADMIN — OMEGA-3 FATTY ACIDS CAP 1000 MG SCH MG: 1000 CAP at 08:49

## 2022-08-06 RX ADMIN — ROSUVASTATIN CALCIUM SCH MG: 20 TABLET, FILM COATED ORAL at 08:48

## 2022-08-06 RX ADMIN — BACITRACIN SCH EACH: 500 OINTMENT TOPICAL at 08:50

## 2022-08-06 RX ADMIN — METOPROLOL SUCCINATE SCH MG: 100 TABLET, EXTENDED RELEASE ORAL at 09:15

## 2022-08-06 NOTE — PM&R PROGRESS NOTE
Subjective


HPI/CC On Admission


Date Seen by Provider:  Aug 6, 2022


Time Seen by Provider:  11:00


Subjective/Events-last exam


8/6/2022:


Doing well


Output is pretty good from ileostomy


Receiving 1 L of fluid today and will plan on Monday Wednesday and Friday for 

few weeks


Trying to prevent high output dehydration while he learns to increase his fluid 

intake








8/5/2022:


Pt is doing a lot better


Discharge on Monday


Rash on back will require Benadryl cream


Ostomy education went really well


100 CC output from ileostomy and 350 last night


Will need IV fluids three times weekly to prevent dehydration








8/4/2022:


Patient doing well


HLIVF after this liter is complete


Update patient on the O2 at night recs by Dr Barrera


Cognition will need to be monitored








8/3/2022:


Doing well


Increased output from ileostomy 700+ so will initiate IVF


[facility] had delayed DC due to high output


Improved otherwise








8/2/2022:


Doing better


Ileostomy functioning well


Gaining strength


Slept better last night


Labs reviewed


No pain





Review of Systems


General:  Fatigue, Malaise





Objective


Exam


Vital Signs





Vital Signs








  Date Time  Temp Pulse Resp B/P (MAP) Pulse Ox O2 Delivery O2 Flow Rate FiO2


 


8/6/22 08:41      Room Air  


 


8/6/22 07:05 36.6 73 20 130/58 (82) 94   





Capillary Refill :


General Appearance:  No Apparent Distress, WD/WN, Chronically ill


HEENT:  PERRL/EOMI, Normal ENT Inspection, Pharynx Normal


Neck:  Full Range of Motion, Normal Inspection, Non Tender, Supple, Carotid 

Bruit


Respiratory:  Chest Non Tender, Lungs Clear, Normal Breath Sounds, No Accessory 

Muscle Use, No Respiratory Distress


Cardiovascular:  Regular Rate, Rhythm, No Edema, No Gallop, No JVD, No Murmur, 

Normal Peripheral Pulses


Gastrointestinal:  Normal Bowel Sounds, No Organomegaly, No Pulsatile Mass, Non 

Tender, Soft


Back:  Normal Inspection, No CVA Tenderness, No Vertebral Tenderness


Extremity:  Normal Capillary Refill, Normal Inspection, Normal Range of Motion, 

Non Tender, No Calf Tenderness, No Pedal Edema


Neurologic/Psychiatric:  Alert, Oriented x3, CNs II-XII Norm as Tested, Abnormal

Gait, Depressed Affect, Motor Weakness (generalized)


Skin:  Normal Color, Warm/Dry


Lymphatic:  No Adenopathy





Results/Procedures


Lab


Patient resulted labs reviewed.





FIM


Transfers


Therapy Code Descriptions/Definitions 





Functional [place] Measure:


0=Not Assessed/NA        4=Minimal Assistance


1=Total Assistance        5=Supervision or Setup


2=Maximal Assistance  6=Modified [place]


3=Moderate Assistance 7=Complete IndependenceSCALE: Activities may be completed 

with or without assistive devices.





6-Indepedent-patient completes the activity by him/herself with no assistance 

from a helper.


5-Set-up or Clean-up Assistance-helper sets up or cleans up; patient completes 

activity. [place] assists only prior to or  


    following the activity.


4-Supervision or Touching Assistance-helper provides verbal cues and/or 

touching/steadying and/or contact guard assistance as patient completes 

activity. Assistance may be provided   


    throughout the activity or intermittently.


3-Partial/Moderate Assistance-helper does LESS THAN HALF the effort. [place] 

lifts, holds or supports trunk or limbs, but provides less than half the effort.


2-Substantial/Maximal Assistance-helper does MORE THAN HALF the effort. [place] 

lifts or holds trunk or limbs and provides more than half the effort.


1-Qljfbseje-bjgnor does ALL the effort. Patient does none of the effort to 

complete the activity. Or, the assistance of 2 or more helpers is required for 

the patient to complete the  


    activity.


If activity was not attempted, code reason:


7-Patient Refused.


9-Not Applicable-not attempted and the patient did not perform the activity 

before the current illness, exacerbation or injury.


10-Not Attempted due to Environmental Limitations-(lack of equipment, weather 

restraints, etc.).


88-Not Attempted due to Medical Conditions or Safety Concerns.


Roll Left to Right (QC):  6


Sit to Lying (QC):  6


Sit to Stand (QC):  6


Chair/Bed-to-Chair Xfer(QC):  6


Car Transfer (QC):  6





Gait Training


Does the Patient Walk?:  Yes


Distance:  250', 150'


Walk 10 feet (QC):  6


Walk 50 ft with 2 Turns(QC):  6


Walk 150 ft (QC):  6


Walking 10ft/uneven surface-QC:  4


Gait Persons Needed:  1


Gait Assistive Device:  Cane Single Point





Wheelchair Training


Does the Pt Use a Wheelchair?:  No


Wheel 50 ft with 2 turns (QC):  9


Wheel 150 ft (QC):  9





Stair Training


 Stair Training: Handrails/:  2 handrails


#of Steps:  8


1 Step (curb) (QC):  4


4 Steps (QC):  6


12 Steps (QC):  88


Stairs:  Pattern:  Reciprocal





Balance


Picking up an Object (QC):  6





ADL-Treatment


Eating (QC):  6


Oral Hygiene (QC):  6


Shower/Bathe Self (QC):  6


Upper Body Dressing (QC):  6


Lower Body Dressing (QC):  6


On/Off Footwear (QC):  3


Toileting Hygiene (QC):  6 (voiding only)


Toilet Transfer (QC):  6





Assessment/Plan


Assessment and Plan


Assess & Plan/Chief Complaint


Assessment:


Myopathy


Rectal adenocarcinoma resection with ileostomy


Long haosmin COVID symptoms


Weight loss 45 pounds


BPH


Anemia


Ileostomy high output needing IVF 8/3/22


Nocturnal hypoxia per sleep study Dr Barrera





Plan:


Monitor closely


Home meds


IRF protocol





8/2/2022:


Monitor fluid intake


Lomotil and Imodium








8/3/2022:


IVF


Monitor closely





8/4/2022:


IVF heplock after this liter done





8/5/2022:


IVF tomorrow


Keep PICC at [facility] for IVF three times a week for a few weeks





8/6/2022:


IV fluids today


Discharge Monday





(1) Colon cancer


(2) COVID-19 long kaitlin


(3) Weight loss


(4) Advanced age


(5) Weakness


Status:  LUCIAN Flores DO                 Aug 6, 2022 06:54

## 2022-08-06 NOTE — PHYSICAL THERAPY DAILY NOTE
PT Daily Note-Current


Subjective


Pt sitting in recliner finishing lunch with Sp upon arrival.  Pt reports feeling

good about Therapy and anticipating d/c on Monday.





Pain





   Location:  No Pain Reported





Mental Status


Patient Orientation:  Person, Place, Time, Situation





Transfers


SCALE: Activities may be completed with or without assistive devices.





6-Indepedent-patient completes the activity by him/herself with no assistance 

from a helper.


5-Set-up or Clean-up Assistance-helper sets up or cleans up; patient completes 

activity. [place] assists only prior to or  


    following the activity.


4-Supervision or Touching Assistance-helper provides verbal cues and/or 

touching/steadying and/or contact guard assistance as patient completes 

activity. Assistance may be provided   


    throughout the activity or intermittently.


3-Partial/Moderate Assistance-helper does LESS THAN HALF the effort. [place] 

lifts, holds or supports trunk or limbs, but provides less than half the effort.


2-Substantial/Maximal Assistance-helper does MORE THAN HALF the effort. [place] 

lifts or holds trunk or limbs and provides more than half the effort.


5-Jfyzaworo-fmpcfv does ALL the effort. Patient does none of the effort to 

complete the activity. Or, the assistance of 2 or more helpers is required for 

the patient to complete the  


    activity.


If activity was not attempted, code reason:


7-Patient Refused.


9-Not Applicable-not attempted and the patient did not perform the activity 

before the current illness, exacerbation or injury.


10-Not Attempted due to Environmental Limitations-(lack of equipment, weather 

restraints, etc.).


88-Not Attempted due to Medical Conditions or Safety Concerns.


Sit to Stand (QC):  6


Toilet Transfer (QC):  6





Weight Bearing


Full Weight Bearing


Full Weight Bearing





Gait Training


Does the Patient Walk?:  Yes


Distance:  15'


Walk 10 feet (QC):  6


Gait Assistive Device:  FWW





Treatments


TF to standing and amb. to BR.  Pt completes pericare and returns to recliner.  

Pt reports not needing anything else at this time from Therapy.  All needs met, 

call light in hand.





Assessment


Current Status:  Good Progress


Pt oc. tx well.





PT Short Term Goals


Short Term Goals


Time Frame:  Aug 8, 2022


Roll Left & Right:  6


Sit to lyin


Lying to sitting on side of be:  6


Sit to stand:  5


Chair/bed-to-chair transfer:  5


Walk 10 feet:  5


Walk 50 feet with two turns:  5


Walk 150 feet:  5





PT Long Term Goals


Long Term Goals


PT Long Term Goals Time Frame:  Aug 22, 2022


Roll Left & Right (QC):  6


Sit to Lying (QC):  6


Lying-Sitting on Side/Bed(QC):  6


Sit to Stand (QC):  6


Chair/Bed-to-Chair Xfer(QC):  6


Toilet Transfer (QC):  6


Car Transfer (QC):  6


Does the Patient Walk:  Yes


Walk 10 feet (QC):  6


Walk 50ft with 2 Turns (QC):  6


Walk 150 ft (QC):  6


Walking 10ft on Uneven Surface:  6


1 Step (curb) (QC):  6


4 Steps (QC):  6


12 Steps (QC):  6


Picking up an Object (QC):  6


Wheel 50 feet with 2 turns (QC:  9


Wheel 150 feet:  9





PT Plan


Treatment/Plan


Treatment Plan:  Continue Plan of Care


Treatment Plan:  Education, Functional Activity Foreign, Functional Strength, 

Group Therapy, Gait, Safety, Therapeutic Exercise, Transfers


Treatment Duration:  Aug 22, 2022


Frequency:  At least 5 of 7 days/Wk (IRF)


Estimated Hrs Per Day:  1.5 hours per day


Patient and/or Family Agrees t:  Yes





Time/GCodes


Time In:  1225


Time Out:  1240


Total Billed Treatment Time:  15


Total Billed Treatment


1ESTEPHANIA (15m)











JOHN LOPES PTA               Aug 6, 2022 13:20

## 2022-08-07 VITALS — DIASTOLIC BLOOD PRESSURE: 57 MMHG | SYSTOLIC BLOOD PRESSURE: 121 MMHG

## 2022-08-07 VITALS — SYSTOLIC BLOOD PRESSURE: 116 MMHG | DIASTOLIC BLOOD PRESSURE: 57 MMHG

## 2022-08-07 RX ADMIN — DIPHENOXYLATE HYDROCHLORIDE AND ATROPINE SULFATE SCH EA: 2.5; .025 TABLET ORAL at 19:50

## 2022-08-07 RX ADMIN — DIPHENOXYLATE HYDROCHLORIDE AND ATROPINE SULFATE SCH EA: 2.5; .025 TABLET ORAL at 08:57

## 2022-08-07 RX ADMIN — Medication SCH MCG: at 08:56

## 2022-08-07 RX ADMIN — LOPERAMIDE HYDROCHLORIDE SCH MG: 2 CAPSULE ORAL at 13:04

## 2022-08-07 RX ADMIN — OMEGA-3 FATTY ACIDS CAP 1000 MG SCH MG: 1000 CAP at 19:50

## 2022-08-07 RX ADMIN — METOPROLOL SUCCINATE SCH MG: 100 TABLET, EXTENDED RELEASE ORAL at 08:57

## 2022-08-07 RX ADMIN — Medication SCH CAP: at 11:55

## 2022-08-07 RX ADMIN — BACITRACIN SCH EACH: 500 OINTMENT TOPICAL at 09:08

## 2022-08-07 RX ADMIN — LIDOCAINE SCH EA: 50 PATCH CUTANEOUS at 08:58

## 2022-08-07 RX ADMIN — BACITRACIN SCH EACH: 500 OINTMENT TOPICAL at 19:51

## 2022-08-07 RX ADMIN — DOCUSATE SODIUM AND SENNOSIDES SCH EA: 8.6; 5 TABLET, FILM COATED ORAL at 20:06

## 2022-08-07 RX ADMIN — LORATADINE SCH MG: 10 TABLET ORAL at 08:57

## 2022-08-07 RX ADMIN — OXYCODONE HYDROCHLORIDE AND ACETAMINOPHEN SCH MG: 500 TABLET ORAL at 08:57

## 2022-08-07 RX ADMIN — DOCUSATE SODIUM SCH MG: 100 CAPSULE ORAL at 09:07

## 2022-08-07 RX ADMIN — LOPERAMIDE HYDROCHLORIDE SCH MG: 2 CAPSULE ORAL at 19:50

## 2022-08-07 RX ADMIN — TAMSULOSIN HYDROCHLORIDE SCH MG: 0.4 CAPSULE ORAL at 08:57

## 2022-08-07 RX ADMIN — LOPERAMIDE HYDROCHLORIDE SCH MG: 2 CAPSULE ORAL at 08:57

## 2022-08-07 RX ADMIN — POLYETHYLENE GLYCOL (3350) SCH GM: 17 POWDER, FOR SOLUTION ORAL at 20:05

## 2022-08-07 RX ADMIN — PANTOPRAZOLE SODIUM SCH MG: 40 TABLET, DELAYED RELEASE ORAL at 08:57

## 2022-08-07 RX ADMIN — ROSUVASTATIN CALCIUM SCH MG: 20 TABLET, FILM COATED ORAL at 08:56

## 2022-08-07 RX ADMIN — DOCUSATE SODIUM AND SENNOSIDES SCH EA: 8.6; 5 TABLET, FILM COATED ORAL at 11:54

## 2022-08-07 RX ADMIN — Medication SCH EA: at 06:56

## 2022-08-07 RX ADMIN — MONTELUKAST SCH MG: 10 TABLET, FILM COATED ORAL at 19:51

## 2022-08-07 RX ADMIN — ASPIRIN SCH MG: 81 TABLET ORAL at 08:57

## 2022-08-07 RX ADMIN — LOPERAMIDE HYDROCHLORIDE SCH MG: 2 CAPSULE ORAL at 18:39

## 2022-08-07 RX ADMIN — DIPHENHYDRAMINE HYDROCHLORIDE, ZINC ACETATE PRN GM: 2; .1 CREAM TOPICAL at 08:56

## 2022-08-07 RX ADMIN — DOCUSATE SODIUM SCH MG: 100 CAPSULE ORAL at 20:05

## 2022-08-07 RX ADMIN — FERROUS SULFATE TAB 325 MG (65 MG ELEMENTAL FE) SCH MG: 325 (65 FE) TAB at 08:58

## 2022-08-07 RX ADMIN — POLYETHYLENE GLYCOL (3350) SCH GM: 17 POWDER, FOR SOLUTION ORAL at 09:08

## 2022-08-07 RX ADMIN — DIPHENHYDRAMINE HYDROCHLORIDE, ZINC ACETATE PRN GM: 2; .1 CREAM TOPICAL at 19:51

## 2022-08-07 RX ADMIN — OMEGA-3 FATTY ACIDS CAP 1000 MG SCH MG: 1000 CAP at 08:57

## 2022-08-07 NOTE — PM&R PROGRESS NOTE
Subjective


HPI/CC On Admission


Date Seen by Provider:  Aug 7, 2022


Time Seen by Provider:  12:00


Subjective/Events-last exam


8/7/2022:


Doing well


Ready for DC tomorrow


IVF MWF for 2 weeks should help prevent dehydration while he increased PO fluid








8/6/2022:


Doing well


Output is pretty good from ileostomy


Receiving 1 L of fluid today and will plan on Monday Wednesday and Friday for 

few weeks


Trying to prevent high output dehydration while he learns to increase his fluid 

intake








8/5/2022:


Pt is doing a lot better


Discharge on Monday


Rash on back will require Benadryl cream


Ostomy education went really well


100 CC output from ileostomy and 350 last night


Will need IV fluids three times weekly to prevent dehydration








8/4/2022:


Patient doing well


HLIVF after this liter is complete


Update patient on the O2 at night recs by Dr Barrera


Cognition will need to be monitored








8/3/2022:


Doing well


Increased output from ileostomy 700+ so will initiate IVF


[facility] had delayed DC due to high output


Improved otherwise








8/2/2022:


Doing better


Ileostomy functioning well


Gaining strength


Slept better last night


Labs reviewed


No pain





Review of Systems


General:  Fatigue, Malaise





Objective


Exam


Vital Signs





Vital Signs








  Date Time  Temp Pulse Resp B/P (MAP) Pulse Ox O2 Delivery O2 Flow Rate FiO2


 


8/7/22 20:07      Room Air  


 


8/7/22 20:00 36.6 65 18 116/57 (76) 96   





Capillary Refill :


General Appearance:  No Apparent Distress, WD/WN, Chronically ill


HEENT:  PERRL/EOMI, Normal ENT Inspection, Pharynx Normal


Neck:  Full Range of Motion, Normal Inspection, Non Tender, Supple, Carotid 

Bruit


Respiratory:  Chest Non Tender, Lungs Clear, Normal Breath Sounds, No Accessory 

Muscle Use, No Respiratory Distress


Cardiovascular:  Regular Rate, Rhythm, No Edema, No Gallop, No JVD, No Murmur, 

Normal Peripheral Pulses


Gastrointestinal:  Normal Bowel Sounds, No Organomegaly, No Pulsatile Mass, Non 

Tender, Soft


Back:  Normal Inspection, No CVA Tenderness, No Vertebral Tenderness


Extremity:  Normal Capillary Refill, Normal Inspection, Normal Range of Motion, 

Non Tender, No Calf Tenderness, No Pedal Edema


Neurologic/Psychiatric:  Alert, Oriented x3, CNs II-XII Norm as Tested, Abnormal

Gait, Depressed Affect, Motor Weakness (generalized)


Skin:  Normal Color, Warm/Dry


Lymphatic:  No Adenopathy





Results/Procedures


Lab


Patient resulted labs reviewed.





FIM


Transfers


Therapy Code Descriptions/Definitions 





Functional [place] Measure:


0=Not Assessed/NA        4=Minimal Assistance


1=Total Assistance        5=Supervision or Setup


2=Maximal Assistance  6=Modified [place]


3=Moderate Assistance 7=Complete IndependenceSCALE: Activities may be completed 

with or without assistive devices.





6-Indepedent-patient completes the activity by him/herself with no assistance 

from a helper.


5-Set-up or Clean-up Assistance-helper sets up or cleans up; patient completes 

activity. [place] assists only prior to or  


    following the activity.


4-Supervision or Touching Assistance-helper provides verbal cues and/or 

touching/steadying and/or contact guard assistance as patient completes 

activity. Assistance may be provided   


    throughout the activity or intermittently.


3-Partial/Moderate Assistance-helper does LESS THAN HALF the effort. [place] 

lifts, holds or supports trunk or limbs, but provides less than half the effort.


2-Substantial/Maximal Assistance-helper does MORE THAN HALF the effort. [place] 

lifts or holds trunk or limbs and provides more than half the effort.


8-Cahqtdbqk-bibsdb does ALL the effort. Patient does none of the effort to 

complete the activity. Or, the assistance of 2 or more helpers is required for 

the patient to complete the  


    activity.


If activity was not attempted, code reason:


7-Patient Refused.


9-Not Applicable-not attempted and the patient did not perform the activity 

before the current illness, exacerbation or injury.


10-Not Attempted due to Environmental Limitations-(lack of equipment, weather 

restraints, etc.).


88-Not Attempted due to Medical Conditions or Safety Concerns.


Roll Left to Right (QC):  6


Sit to Lying (QC):  6


Sit to Stand (QC):  6


Chair/Bed-to-Chair Xfer(QC):  6


Car Transfer (QC):  6





Gait Training


Does the Patient Walk?:  Yes


Distance:  15'


Walk 10 feet (QC):  6


Walk 50 ft with 2 Turns(QC):  6


Walk 150 ft (QC):  6


Walking 10ft/uneven surface-QC:  4


Gait Persons Needed:  1


Gait Assistive Device:  FWW





Wheelchair Training


Does the Pt Use a Wheelchair?:  No


Wheel 50 ft with 2 turns (QC):  9


Wheel 150 ft (QC):  9





Stair Training


 Stair Training: Handrails/:  2 handrails


#of Steps:  8


1 Step (curb) (QC):  4


4 Steps (QC):  6


12 Steps (QC):  88


Stairs:  Pattern:  Reciprocal





Balance


Picking up an Object (QC):  6





ADL-Treatment


Eating (QC):  6


Oral Hygiene (QC):  6


Shower/Bathe Self (QC):  6


Upper Body Dressing (QC):  6


Lower Body Dressing (QC):  6


On/Off Footwear (QC):  3


Toileting Hygiene (QC):  6 (voiding only)


Toilet Transfer (QC):  6





Assessment/Plan


Assessment and Plan


Assess & Plan/Chief Complaint


Assessment:


Myopathy


Rectal adenocarcinoma resection with ileostomy


Long hauler COVID symptoms


Weight loss 45 pounds


BPH


Anemia


Ileostomy high output needing IVF 8/3/22


Nocturnal hypoxia per sleep study Dr Barrera





Plan:


Monitor closely


Home meds


IRF protocol





8/2/2022:


Monitor fluid intake


Lomotil and Imodium








8/3/2022:


IVF


Monitor closely





8/4/2022:


IVF heplock after this liter done





8/5/2022:


IVF tomorrow


Keep PICC at [facility] for IVF three times a week for a few weeks





8/6/2022:


IV fluids today


Discharge Monday 8/7/2022:


IVF in morning at 0600





(1) Colon cancer


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


(3) Weight loss


(4) Advanced age


(5) Weakness


Status:  Acute











LUCIAN SAEED DO                 Aug 7, 2022 06:38

## 2022-08-08 VITALS — DIASTOLIC BLOOD PRESSURE: 61 MMHG | SYSTOLIC BLOOD PRESSURE: 139 MMHG

## 2022-08-08 VITALS — SYSTOLIC BLOOD PRESSURE: 139 MMHG | DIASTOLIC BLOOD PRESSURE: 61 MMHG

## 2022-08-08 LAB
ALBUMIN SERPL-MCNC: 2.8 GM/DL (ref 3.2–4.5)
ALP SERPL-CCNC: 54 U/L (ref 40–136)
ALT SERPL-CCNC: 25 U/L (ref 0–55)
BASOPHILS # BLD AUTO: 0 10^3/UL (ref 0–0.1)
BASOPHILS NFR BLD AUTO: 1 % (ref 0–10)
BILIRUB SERPL-MCNC: 0.3 MG/DL (ref 0.1–1)
BUN/CREAT SERPL: 11
CALCIUM SERPL-MCNC: 8.8 MG/DL (ref 8.5–10.1)
CHLORIDE SERPL-SCNC: 107 MMOL/L (ref 98–107)
CO2 SERPL-SCNC: 23 MMOL/L (ref 21–32)
CREAT SERPL-MCNC: 0.8 MG/DL (ref 0.6–1.3)
EOSINOPHIL # BLD AUTO: 0.2 10^3/UL (ref 0–0.3)
EOSINOPHIL NFR BLD AUTO: 4 % (ref 0–10)
GFR SERPLBLD BASED ON 1.73 SQ M-ARVRAT: 87 ML/MIN
GLUCOSE SERPL-MCNC: 128 MG/DL (ref 70–105)
HCT VFR BLD CALC: 32 % (ref 40–54)
HGB BLD-MCNC: 10.4 G/DL (ref 13.3–17.7)
LYMPHOCYTES # BLD AUTO: 0.9 10^3/UL (ref 1–4)
LYMPHOCYTES NFR BLD AUTO: 16 % (ref 12–44)
MANUAL DIFFERENTIAL PERFORMED BLD QL: NO
MCH RBC QN AUTO: 32 PG (ref 25–34)
MCHC RBC AUTO-ENTMCNC: 32 G/DL (ref 32–36)
MCV RBC AUTO: 98 FL (ref 80–99)
MONOCYTES # BLD AUTO: 0.7 10^3/UL (ref 0–1)
MONOCYTES NFR BLD AUTO: 13 % (ref 0–12)
NEUTROPHILS # BLD AUTO: 3.6 10^3/UL (ref 1.8–7.8)
NEUTROPHILS NFR BLD AUTO: 66 % (ref 42–75)
PLATELET # BLD: 279 10^3/UL (ref 130–400)
PMV BLD AUTO: 9.5 FL (ref 9–12.2)
POTASSIUM SERPL-SCNC: 3.7 MMOL/L (ref 3.6–5)
PROT SERPL-MCNC: 5.9 GM/DL (ref 6.4–8.2)
SODIUM SERPL-SCNC: 141 MMOL/L (ref 135–145)
WBC # BLD AUTO: 5.4 10^3/UL (ref 4.3–11)

## 2022-08-08 RX ADMIN — Medication SCH MCG: at 08:51

## 2022-08-08 RX ADMIN — DOCUSATE SODIUM AND SENNOSIDES SCH EA: 8.6; 5 TABLET, FILM COATED ORAL at 09:00

## 2022-08-08 RX ADMIN — TAMSULOSIN HYDROCHLORIDE SCH MG: 0.4 CAPSULE ORAL at 08:52

## 2022-08-08 RX ADMIN — OMEGA-3 FATTY ACIDS CAP 1000 MG SCH MG: 1000 CAP at 08:52

## 2022-08-08 RX ADMIN — LORATADINE SCH MG: 10 TABLET ORAL at 08:52

## 2022-08-08 RX ADMIN — BACITRACIN SCH EACH: 500 OINTMENT TOPICAL at 10:57

## 2022-08-08 RX ADMIN — ASPIRIN SCH MG: 81 TABLET ORAL at 08:51

## 2022-08-08 RX ADMIN — OXYCODONE HYDROCHLORIDE AND ACETAMINOPHEN SCH MG: 500 TABLET ORAL at 08:51

## 2022-08-08 RX ADMIN — Medication SCH EA: at 06:45

## 2022-08-08 RX ADMIN — PANTOPRAZOLE SODIUM SCH MG: 40 TABLET, DELAYED RELEASE ORAL at 08:52

## 2022-08-08 RX ADMIN — POLYETHYLENE GLYCOL (3350) SCH GM: 17 POWDER, FOR SOLUTION ORAL at 09:00

## 2022-08-08 RX ADMIN — METOPROLOL SUCCINATE SCH MG: 100 TABLET, EXTENDED RELEASE ORAL at 08:52

## 2022-08-08 RX ADMIN — SODIUM CHLORIDE SCH MLS/HR: 900 INJECTION, SOLUTION INTRAVENOUS at 01:39

## 2022-08-08 RX ADMIN — ROSUVASTATIN CALCIUM SCH MG: 20 TABLET, FILM COATED ORAL at 08:51

## 2022-08-08 RX ADMIN — LIDOCAINE SCH EA: 50 PATCH CUTANEOUS at 08:51

## 2022-08-08 RX ADMIN — SODIUM CHLORIDE SCH MLS/HR: 900 INJECTION, SOLUTION INTRAVENOUS at 01:41

## 2022-08-08 RX ADMIN — DIPHENOXYLATE HYDROCHLORIDE AND ATROPINE SULFATE SCH EA: 2.5; .025 TABLET ORAL at 08:51

## 2022-08-08 RX ADMIN — Medication SCH CAP: at 09:00

## 2022-08-08 RX ADMIN — LOPERAMIDE HYDROCHLORIDE SCH MG: 2 CAPSULE ORAL at 08:51

## 2022-08-08 RX ADMIN — DOCUSATE SODIUM SCH MG: 100 CAPSULE ORAL at 09:00

## 2022-08-08 RX ADMIN — FERROUS SULFATE TAB 325 MG (65 MG ELEMENTAL FE) SCH MG: 325 (65 FE) TAB at 08:51

## 2022-08-08 NOTE — DISCHARGE SUMMARY
Diagnosis/Chief Complaint


Date of Admission


Aug 1, 2022 at 13:58


Date of Discharge





Discharge Date:  Aug 8, 2022


Discharge Diagnosis


Assessment:


Myopathy


Rectal adenocarcinoma resection with ileostomy


Long hauler COVID symptoms


Weight loss 45 pounds


BPH


Anemia


Ileostomy high output needing IVF 8/3/22


Nocturnal hypoxia per sleep study Dr Barrera





Plan:


Monitor closely


Home meds


IRF protocol





8/2/2022:


Monitor fluid intake


Lomotil and Imodium








8/3/2022:


IVF


Monitor closely





8/4/2022:


IVF heplock after this liter done





8/5/2022:


IVF tomorrow


Keep PICC at [facility] for IVF three times a week for a few weeks





8/6/2022:


IV fluids today


Discharge Monday 8/7/2022:


IVF in morning at 0600





(1) Colon cancer


(2) COVID-19 long kaitlin


(3) Weight loss


(4) Advanced age


(5) Weakness


Status:  Acute





Discharge Summary


Discharge Physical Examination


Allergies:  


Coded Allergies:  


     albuterol (Verified  Allergy, Unknown, Tachycardia , 8/1/22)


     aspartame (Verified  Allergy, Unknown, Causes extreme brain fog per pt, 

8/1/22)


     morphine (Verified  Allergy, Unknown, 8/1/22)


     sucralose (Verified  Allergy, Unknown, Splenda- heart racing, 8/1/22)


Vitals & I&Os





Vital Signs








  Date Time  Temp Pulse Resp B/P (MAP) Pulse Ox O2 Delivery O2 Flow Rate FiO2


 


8/8/22 13:54 36.7 65 16 139/61 93 Room Air  








General Appearance:  Alert, Oriented X3, Cooperative


Respiratory:  Clear to Auscultation


Cardiovascular:  Regular Rate


Neuro:  Normal Gait, Normal Speech, Strength at 5/5 X4 Ext


Psych/Mental Status:  Mental Status NL





Hospital Course


Was the Problem List Reviewed?:  Yes


Pt had an uneventful 8 day hospital course after he was admitted from  after a

new ileostomy with high output issues. He did require IV fluids on a regular 

basis. Labs remain stable. He was able to regain much of his baseline function. 

Ambulatory with the use of therapy. He was deemed stable for discharge in 

improved condition.


Labs (last 24 hrs)


Laboratory Tests


8/2/22 05:28: 


White Blood Count 6.4, Red Blood Count 3.43L, Hemoglobin 11.0L, Hematocrit 33L, 

Mean Corpuscular Volume 97, Mean Corpuscular Hemoglobin 32, Mean Corpuscular 

Hemoglobin Concent 33, Red Cell Distribution Width 14.0, Platelet Count 293, 

Mean Platelet Volume 9.4, Immature Granulocyte % (Auto) 1, Neutrophils (%) 

(Auto) 65, Lymphocytes (%) (Auto) 14, Monocytes (%) (Auto) 17H, Eosinophils (%) 

(Auto) 3, Basophils (%) (Auto) 1, Neutrophils # (Auto) 4.2, Lymphocytes # (Auto)

0.9L, Monocytes # (Auto) 1.1H, Eosinophils # (Auto) 0.2, Basophils # (Auto) 0.0,

Immature Granulocyte # (Auto) 0.1, Sodium Level 138, Potassium Level 3.7, 

Chloride Level 104, Carbon Dioxide Level 24, Anion Gap 10, Blood Urea Nitrogen 

13, Creatinine 0.80, Estimat Glomerular Filtration Rate 87, BUN/Creatinine Ratio

16, Glucose Level 94, Calcium Level 9.0, Corrected Calcium 9.8, Total Bilirubin 

0.5, Aspartate Amino Transf (AST/SGOT) 17, Alanine Aminotransferase (ALT/SGPT) 

30, Alkaline Phosphatase 52, Total Protein 6.0L, Albumin 3.0L


8/4/22 06:17: 


White Blood Count 5.9, Red Blood Count 3.31L, Hemoglobin 10.5L, Hematocrit 32L, 

Mean Corpuscular Volume 97, Mean Corpuscular Hemoglobin 32, Mean Corpuscular 

Hemoglobin Concent 33, Red Cell Distribution Width 14.0, Platelet Count 286, 

Mean Platelet Volume 8.9L, Immature Granulocyte % (Auto) 0, Neutrophils (%) 

(Auto) 63, Lymphocytes (%) (Auto) 17, Monocytes (%) (Auto) 15H, Eosinophils (%) 

(Auto) 4, Basophils (%) (Auto) 1, Neutrophils # (Auto) 3.7, Lymphocytes # (Auto)

1.0, Monocytes # (Auto) 0.9, Eosinophils # (Auto) 0.2, Basophils # (Auto) 0.1, 

Immature Granulocyte # (Auto) 0.0, Sodium Level 137, Potassium Level 3.8, 

Chloride Level 106, Carbon Dioxide Level 22, Anion Gap 9, Blood Urea Nitrogen 9,

Creatinine 0.83, Estimat Glomerular Filtration Rate 86, BUN/Creatinine Ratio 11,

Glucose Level 113H, Calcium Level 8.5, Corrected Calcium 9.5, Total Bilirubin 

0.4, Aspartate Amino Transf (AST/SGOT) 24, Alanine Aminotransferase (ALT/SGPT) 

31, Alkaline Phosphatase 63, Total Protein 5.5L, Albumin 2.8L, Magnesium Level 

1.6


8/8/22 05:00: 


White Blood Count 5.4, Red Blood Count 3.30L, Hemoglobin 10.4L, Hematocrit 32L, 

Mean Corpuscular Volume 98, Mean Corpuscular Hemoglobin 32, Mean Corpuscular 

Hemoglobin Concent 32, Red Cell Distribution Width 13.8, Platelet Count 279, 

Mean Platelet Volume 9.5, Immature Granulocyte % (Auto) 0, Neutrophils (%) (Aut

o) 66, Lymphocytes (%) (Auto) 16, Monocytes (%) (Auto) 13H, Eosinophils (%) 

(Auto) 4, Basophils (%) (Auto) 1, Neutrophils # (Auto) 3.6, Lymphocytes # (Auto)

0.9L, Monocytes # (Auto) 0.7, Eosinophils # (Auto) 0.2, Basophils # (Auto) 0.0, 

Immature Granulocyte # (Auto) 0.0, Sodium Level 141, Potassium Level 3.7, 

Chloride Level 107, Carbon Dioxide Level 23, Anion Gap 11, Blood Urea Nitrogen 

9, Creatinine 0.80, Estimat Glomerular Filtration Rate 87, BUN/Creatinine Ratio 

11, Glucose Level 128H, Calcium Level 8.8, Corrected Calcium 9.8, Total 

Bilirubin 0.3, Aspartate Amino Transf (AST/SGOT) 23, Alanine Aminotransferase 

(ALT/SGPT) 25, Alkaline Phosphatase 54, Total Protein 5.9L, Albumin 2.8L





Pending Labs


Laboratory Tests


8/2/22 05:28: 


White Blood Count 6.4, Red Blood Count 3.43, Hemoglobin 11.0, Hematocrit 33, 

Mean Corpuscular Volume 97, Mean Corpuscular Hemoglobin 32, Mean Corpuscular 

Hemoglobin Concent 33, Red Cell Distribution Width 14.0, Platelet Count 293, 

Mean Platelet Volume 9.4, Immature Granulocyte % (Auto) 1, Neutrophils (%) 

(Auto) 65, Lymphocytes (%) (Auto) 14, Monocytes (%) (Auto) 17, Eosinophils (%) 

(Auto) 3, Basophils (%) (Auto) 1, Neutrophils # (Auto) 4.2, Lymphocytes # (Auto)

0.9, Monocytes # (Auto) 1.1, Eosinophils # (Auto) 0.2, Basophils # (Auto) 0.0, 

Immature Granulocyte # (Auto) 0.1, Sodium Level 138, Potassium Level 3.7, 

Chloride Level 104, Carbon Dioxide Level 24, Anion Gap 10, Blood Urea Nitrogen 

13, Creatinine 0.80, Estimat Glomerular Filtration Rate 87, BUN/Creatinine Ratio

16, Glucose Level 94, Calcium Level 9.0, Corrected Calcium 9.8, Total Bilirubin 

0.5, Aspartate Amino Transf (AST/SGOT) 17, Alanine Aminotransferase (ALT/SGPT) 

30, Alkaline Phosphatase 52, Total Protein 6.0, Albumin 3.0


8/4/22 06:17: 


White Blood Count 5.9, Red Blood Count 3.31, Hemoglobin 10.5, Hematocrit 32, 

Mean Corpuscular Volume 97, Mean Corpuscular Hemoglobin 32, Mean Corpuscular 

Hemoglobin Concent 33, Red Cell Distribution Width 14.0, Platelet Count 286, 

Mean Platelet Volume 8.9, Immature Granulocyte % (Auto) 0, Neutrophils (%) 

(Auto) 63, Lymphocytes (%) (Auto) 17, Monocytes (%) (Auto) 15, Eosinophils (%) 

(Auto) 4, Basophils (%) (Auto) 1, Neutrophils # (Auto) 3.7, Lymphocytes # (Auto)

1.0, Monocytes # (Auto) 0.9, Eosinophils # (Auto) 0.2, Basophils # (Auto) 0.1, 

Immature Granulocyte # (Auto) 0.0, Sodium Level 137, Potassium Level 3.8, 

Chloride Level 106, Carbon Dioxide Level 22, Anion Gap 9, Blood Urea Nitrogen 9,

Creatinine 0.83, Estimat Glomerular Filtration Rate 86, BUN/Creatinine Ratio 11,

Glucose Level 113, Calcium Level 8.5, Corrected Calcium 9.5, Total Bilirubin 

0.4, Aspartate Amino Transf (AST/SGOT) 24, Alanine Aminotransferase (ALT/SGPT) 

31, Alkaline Phosphatase 63, Total Protein 5.5, Albumin 2.8, Magnesium Level 1.6


8/8/22 05:00: 


White Blood Count 5.4, Red Blood Count 3.30, Hemoglobin 10.4, Hematocrit 32, 

Mean Corpuscular Volume 98, Mean Corpuscular Hemoglobin 32, Mean Corpuscular 

Hemoglobin Concent 32, Red Cell Distribution Width 13.8, Platelet Count 279, 

Mean Platelet Volume 9.5, Immature Granulocyte % (Auto) 0, Neutrophils (%) 

(Auto) 66, Lymphocytes (%) (Auto) 16, Monocytes (%) (Auto) 13, Eosinophils (%) 

(Auto) 4, Basophils (%) (Auto) 1, Neutrophils # (Auto) 3.6, Lymphocytes # (Auto)

0.9, Monocytes # (Auto) 0.7, Eosinophils # (Auto) 0.2, Basophils # (Auto) 0.0, 

Immature Granulocyte # (Auto) 0.0, Sodium Level 141, Potassium Level 3.7, 

Chloride Level 107, Carbon Dioxide Level 23, Anion Gap 11, Blood Urea Nitrogen 

9, Creatinine 0.80, Estimat Glomerular Filtration Rate 87, BUN/Creatinine Ratio 

11, Glucose Level 128, Calcium Level 8.8, Corrected Calcium 9.8, Total Bilirubin

0.3, Aspartate Amino Transf (AST/SGOT) 23, Alanine Aminotransferase (ALT/SGPT) 

25, Alkaline Phosphatase 54, Total Protein 5.9, Albumin 2.8








Discharge


Home Medications:





Active Scripts


Active


Sodium Chloride (Normal Saline) 50 Ml Bag 500 Ml IV UD


     infuse 500cc of Normal saline on Wednesday, 8/10/22, Friday, 8/12/22,


     Monday, 8/15/22, Wednesday, 8/17/22 and Friday, 8/19/22 wide open rate


Flomax (Tamsulosin HCl) 0.4 Mg Cap 0.4 Mg PO DAILY


Imodium A-D (Loperamide HCl) 2 Mg Capsule 4 Mg PO QID


     TAKES 2 (2MG) TABS


Lomotil 2.5-0.025 mg Tablet (Diphenoxylate HCl/Atropine) 2.5 Mg-0.025 Mg Tablet 

1 Each PO BID


Reported


Multivitamin 1 Each Tablet 1 Each PO DAILY


Icaps Areds Softgel (Vit A/Vit C/Vit E/Zinc/Copper) 14,320-226 Capsule 1 Each PO

DAILY


Rosuvastatin Calcium 20 Mg Tablet 20 Mg PO DAILY


Resveratrol 250 Mg Capsule 250 Mg PO DAILY


Potassium (Potassium Citrate) 99 Mg Capsule 99 Mg PO HS


Pantoprazole Sodium 40 Mg Tablet.dr 40 Mg PO DAILY


[Bjorn Treeze]  Liquid 1-2 Ea PO HS


     TAKES 1 TO 2 CAPFULS


     OTC SUPPLEMENT W/ VINEGAR,GRICELDA,GARLIC


[Cramp Defense Magnes]   3 Ea PO HS


Montelukast Sodium 10 Mg Tablet 10 Mg PO DAILY


Metoprolol Succinate 100 Mg Tab.er.24h 100 Mg PO DAILY


Osteo Bi-Flex Caplet (Gluc/Sergio-MSM#1/C/Gordon/Eduin/Bor) 750 Mg-644 Mg-30 Mg-1 Mg-

1.5 Mg Tablet 1 Each PO DAILY


Fish Oil 1,000 mg Capsule (Omega 3 Polyunsat Fatty Acids) 340 Mg-1,000 Mg Cap 

1,000 Mg PO BID


Ferrous Gluconate 324 Mg (37.5 Mg Iron) Tablet 162 Mg PO DAILY


     TAKES  OF A 324MG TAB


Coq-10 (Ubidecarenone) 100 Mg Capsule 100 Mg PO DAILY


Vitamin D3 (Cholecalciferol (Vitamin D3)) 25 Mcg (1000 Unit) Tablet 25 Mcg PO 

DAILY


Cetirizine HCl 10 Mg Tablet 10 Mg PO DAILY


Betamethasone Dipropionate 0.05 % Cream..g. 1 Applic TP DAILY PRN


     NO SPECIFIC APPLICATION AREA OR PRN USE LISTED ON [facility] DISCHARGE


Aspirin EC (Aspirin) 81 Mg Tablet.dr 81 Mg PO DAILY


     TAKE WITH FOOD


Vitamin C (Ascorbate Calcium) 500 Mg Tablet 500 Mg PO DAILY


Tramadol HCl 50 Mg Tablet 50 Mg PO Q8H PRN


[Magic Mouthwash]  Susp 10 Ml  TIDAC


     SWISH AND SWALLOW PER KU DISCHARGE: DIPHENHYDRAMINE/LICODAINE/ANTACID


     1:1:1


Bacitracin 500 Unit/Gram Oint...g. 1 Applic TP BID


     APPLY TO TOP OF LEFT HAND WOUND


Tylenol (Acetaminophen) 325 Mg Capsule 975 Mg PO Q8H PRN


     TAKES 3 (325MG) TABS





Instructions to patient/family


Please see electronic discharge instructions given to patient.





Diagnosis/Problems


Diagnosis/Problems





(1) Colon cancer


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


(3) Weight loss


(4) Advanced age


(5) Weakness


Status:  Acute











LUCIAN SAEED DO                 Aug 8, 2022 05:46

## 2022-08-08 NOTE — PHYSICAL THERAPY DAILY NOTE
PT Daily Note-Current


Subjective


Patient in recliner pre tx, agrees to PT, has no complaints of pain.





Appearance


Patient in recliner post tx with nurse call, phone, tray, all needs met.





Mental Status


Patient Orientation:  Person, Place, Situation


Attachments:  IV





Transfers


SCALE: Activities may be completed with or without assistive devices.





6-Indepedent-patient completes the activity by him/herself with no assistance 

from a helper.


5-Set-up or Clean-up Assistance-helper sets up or cleans up; patient completes 

activity. [place] assists only prior to or  


    following the activity.


4-Supervision or Touching Assistance-helper provides verbal cues and/or 

touching/steadying and/or contact guard assistance as patient completes 

activity. Assistance may be provided   


    throughout the activity or intermittently.


3-Partial/Moderate Assistance-helper does LESS THAN HALF the effort. [place] 

lifts, holds or supports trunk or limbs, but provides less than half the effort.


2-Substantial/Maximal Assistance-helper does MORE THAN HALF the effort. [place] 

lifts or holds trunk or limbs and provides more than half the effort.


3-Elescghbe-jjyxiz does ALL the effort. Patient does none of the effort to 

complete the activity. Or, the assistance of 2 or more helpers is required for 

the patient to complete the  


    activity.


If activity was not attempted, code reason:


7-Patient Refused.


9-Not Applicable-not attempted and the patient did not perform the activity 

before the current illness, exacerbation or injury.


10-Not Attempted due to Environmental Limitations-(lack of equipment, weather 

restraints, etc.).


88-Not Attempted due to Medical Conditions or Safety Concerns.


Roll Left & Right (QC):  6


Sit to Lying (QC):  6


Lying to Sitting/Side of Bed(Q:  6


Sit to Stand (QC):  6


Chair/Bed-to-Chair Xfer(QC):  6


Toilet Transfer (QC):  6


Car Transfer (QC):  6


Patient performs rolling and supine <-> sit with independence, sit <-> stand and

transfers independent, car transfer independent.





Weight Bearing


Full Weight Bearing


Full Weight Bearing





Gait Training


Distance:  300', 120'


Walk 10 feet (QC):  6


Walk 50 ft with 2 Turns(QC):  6


Walk 150 ft (QC):  6


Walking 10ft/uneven surface-QC:  6


Gait Assistive Device:  Cane Single Point


Patient can ambulate over 300' with a single point cane with independence 

(including 50' with at least 2 turns of 90 degrees and 10' over an uneven 

surface), gait is slow but steady





Wheelchair Training


Wheel 50 ft with 2 turns (QC):  9


Wheel 150 ft (QC):  9





Stair Training


 Stair Training: Handrails/:  2 handrails


#of Steps:  12


1 Step (curb) (QC):  4


4 Steps (QC):  4


12 Steps (QC):  4


Stairs:  Pattern:  Reciprocal


Patient can go up and down 12 steps using 2 handrails with SBA





Balance


Picking up an Object (QC):  6 (using a reacher)





Neuromuscular


Patient scored 25/28 on the Tinetti Assessment Tool





Treatments


bed mobility and transfers, ambulation, stair training, gait training





Assessment


Current Status:  Fair Progress


independent with most mobility except SBA for stairs





PT Short Term Goals


Short Term Goals


Time Frame:  Aug 8, 2022


Roll Left & Right:  6


Sit to lyin


Lying to sitting on side of be:  6


Sit to stand:  5


Chair/bed-to-chair transfer:  5


Walk 10 feet:  5


Walk 50 feet with two turns:  5


Walk 150 feet:  5





PT Long Term Goals


Long Term Goals


PT Long Term Goals Time Frame:  Aug 22, 2022


Roll Left & Right (QC):  6


Sit to Lying (QC):  6


Lying-Sitting on Side/Bed(QC):  6


Sit to Stand (QC):  6


Chair/Bed-to-Chair Xfer(QC):  6


Toilet Transfer (QC):  6


Car Transfer (QC):  6


Does the Patient Walk:  Yes


Walk 10 feet (QC):  6


Walk 50ft with 2 Turns (QC):  6


Walk 150 ft (QC):  6


Walking 10ft on Uneven Surface:  6


1 Step (curb) (QC):  6


4 Steps (QC):  6


12 Steps (QC):  6


Picking up an Object (QC):  6


Wheel 50 feet with 2 turns (QC:  9


Wheel 150 feet:  9





PT Plan


Problem List


Problem List:  Activity Tolerance, Functional Strength, Safety, Balance, Gait, 

Transfer, ROM





Treatment/Plan


Treatment Plan:  Continue Plan of Care


Treatment Plan:  Education, Functional Activity Foreign, Functional Strength, 

Group Therapy, Gait, Safety, Therapeutic Exercise, Transfers


Treatment Duration:  Aug 22, 2022


Frequency:  At least 5 of 7 days/Wk (IRF)


Estimated Hrs Per Day:  1.5 hours per day


Patient and/or Family Agrees t:  Yes





Safety Risks/Education


Patient Education:  Gait Training, Transfer Techniques, Steps, Correct 

Positioning, Safety Issues


Teaching Recipient:  Patient


Teaching Methods:  Demonstration, Discussion


Response to Teaching:  Reinforcement Needed





Time/GCodes


Time In:  1000


Time Out:  1015


Total Billed Treatment Time:  15


Total Billed Treatment


1 visit


FA 15'











TOMY SAUL PT                 Aug 8, 2022 10:17

## 2022-08-08 NOTE — D/C HH FACE TO FACE ORDER
D/C HH Face to Face Orders


Reconcile Patient Problems


Problems Reviewed?:  Yes





Instructions for Patient


HH


Patient Instructions/FollowUp:  


PCP 1 week


Physician to follow Patient:  [facility]


Discharge Diet for Home:  No Restrictions


Patient Problems:  


Ileostomy





Patient Data-Allergies,Ht & Wt


Patient Allergies:  


Coded Allergies:  


     albuterol (Verified  Allergy, Unknown, Tachycardia , 8/1/22)


     aspartame (Verified  Allergy, Unknown, Causes extreme brain fog per pt, 

8/1/22)


     morphine (Verified  Allergy, Unknown, 8/1/22)


     sucralose (Verified  Allergy, Unknown, Splenda- heart racing, 8/1/22)





Home Health Need/Face to Face


Date of Face to Face:  Aug 8, 2022


Clinical Findings:  Generalized weakness and fatigue, Instability, Muscle 

weakness, Unsteady gait


I have seen Pt face-to-face:  Yes


Discharged To:  Home


Diagnosis/Conditions:  


Ileostomy


Patient is Homebound due to:  Larisa fall risk due to instabilty, Muscle weakness


Homebound Status


   Due to the above stated illness, injury or surgical procedure (medical 

condition or diagnosis) and associated clinical findings, the patient is 

homebound because of his/her inability to leave home except with aid of a 

supportive device and/or person AND leaving the home requires a considerable and

taxing effort or is medically contraindicated.


Pt req the following assistanc:  Walker





Home Health Nursing Orders


Home Health Services Order:  Nursing Services, Occupational Ther-Evaluate & 

Treat, Physical Therapy-Evaluate & Treat





Home Health Infusion Therapy


Line Start Date:  Aug 3, 2022


Certify Stmt


I certify that this patient is under my care and that I, a nurse practitioner or

a physician; a assistant working with me, had a face to face encounter that -

meets the physician face to face encounter requirements with this patient as 

dated.











LUCIAN SAEED DO                 Aug 8, 2022 05:43

## 2022-08-08 NOTE — THERAPY TEAM DISCHARGE SUMMARY
Therapy Discharge Summary


Discharge Recommendations


Date of Discharge


Aug 8, 2022 at 13:54


Therapy D/C Recommendations:  Home w/ Family Support





Physical Therapy


Patient came to rehab with RECTAL CANCER S/P RESECTION WITH DIVERTING LOOP 

ILEOSTOMY.  Upon evaluation patient performs rolling and supine <-> sit with 

independence, sit <-> stand and transfers SBA, car transfer SBA, ambulate 300' 

with a rolling walker with SBA (including 50' with at least 2 turns of 90 

degrees and 10' over an uneven surface), went up and down 4 steps using 2 

handrails with SBA, and picked up an object from the floor using a reacher with 

SBA.  Patient has been performing bed mobility and transfer training, balance 

and endurance training, functional strengthening, stair training, gait training,

and education.  Patient has made good progress and has met all of his long term 

goals except for stairs.  Now, patient performs rolling and supine <-> sit with 

independence, sit <-> stand and transfers independent, car transfer independent,

ambulate over 300' with a single point cane with independence (including 50' 

with at least 2 turns of 90 degrees and 10' over an uneven surface), can go up 

and down 12 steps using 2 handrails with SBA, and can  an object from the

floor using a reacher with independence.  Patient has been discharged from this 

facility and will be discharged from PT at this time.


Roll Left to Right (QC):  6


Sit to Lying (QC):  6


Lying to Sitting/Side of Bed(Q:  6


Sit to Stand (QC):  6


Chair/Bed-to-Chair Xfer(QC):  6


Toilet Transfer (QC):  6


Car Transfer (QC):  6


Does the Patient Walk:  Yes


Mode of Locomotion:  Walk


Anticipated Mode of Locomotion:  Walk


Walk 10 feet (QC):  6


Walk 50 ft with 2 Turns(QC):  6


Walk 150 ft (QC):  6


Walking 10ft on uneven surface:  6


Distance:  300'x2, 120'


Gait Assistive Device:  Cane Single Point


Does the Pt Use a Wheelchair:  No


Wheel 50 ft with 2 turns (QC):  9


Wheel 150 ft (QC):  9


#of Steps:  12


1 Step (curb) (QC):  4


4 Steps (QC):  4


12 Steps (QC):  4


Balance Sitting Static:  Normal


Balance Sitting Dynamic:  Normal


Balance-Standing Static:  Good


Picking up an Object (QC):  6 (using a reacher)





Occupational Therapy


Decreased Activ Tolerance


Eating (QC):  6


Oral Hygiene (QC):  6


Shower/Bathe Self (QC):  6


Upper Body Dressing (QC):  6


Lower Body Dressing (QC):  6


On/Off Footwear (QC):  6 (shoes)


Toileting Hygiene (QC):  5 (Voiding independent.  Set up per pt and nrsg notes 

for ostomy)





PT Long Term Goals


Long Term Goals


PT Long Term Goals Time Frame:  Aug 22, 2022


Roll Left to Right (QC):  6


Sit to Lying (QC):  6


Lying-Sitting on Side/Bed(QC):  6


Sit to Stand (QC):  6


Chair/Bed-to-Chair Xfer(QC):  6


Car Transfer (QC):  6


Does the Patient Walk:  Yes


Walk 10 feet (QC):  6


Walk 10ft-Uneven Surface(QC):  6


Walk 50ft with 2 Turns (QC):  6


Walk 150 ft (QC):  6


Wheel 50 feet with 2 turns (QC:  9


1 Step (curb) (QC):  6


4 Steps (QC):  6


12 Steps (QC):  6


Picking up an Object (QC):  6





OT Long Term Goals


Long Term Goals


Time Frame:  Aug 11, 2022


Eating (FIM):  6


Eating (QC):  6 (met)


Oral Hygiene (QC):  6 (met)


Shower/Bathe Self (QC):  5 (met)


Upper Body Dressing (QC):  6 (met)


Lower Body Dressing (QC):  6 (met)


On/Off Footwear (QC):  6 (shoes only-met)


Toileting(FIM):  6


Toileting Hygiene (QC):  5 (set up for colostomy-met)


Toilet/Commode Transfer (QC):  6 (met)


Add: Edema management to assist with use of L UE during ADLs- goal pt indep

endent with HEP


1=Demonstrate adherence to instructed precautions during ADL tasks.


2=Patient will verbalize/demonstrate understanding of assistive 

devices/modifications for ADL.


3=Patient will improve strength/tolerance for activity to enable patient to p

erform ADL's.











TOMY SAUL PT                 Aug 8, 2022 15:28

## 2022-08-08 NOTE — THERAPY TEAM DISCHARGE SUMMARY
Therapy Discharge Summary


Discharge Recommendations


Date of Discharge





Therapy D/C Recommendations:  Home w/ Family Support





Physical Therapy


Roll Left to Right (QC):  6


Sit to Lying (QC):  6


Lying to Sitting/Side of Bed(Q:  6


Sit to Stand (QC):  6


Chair/Bed-to-Chair Xfer(QC):  6


Toilet Transfer (QC):  6


Car Transfer (QC):  6


Does the Patient Walk:  Yes


Mode of Locomotion:  Walk


Anticipated Mode of Locomotion:  Walk


Walk 10 feet (QC):  6


Walk 50 ft with 2 Turns(QC):  6


Walk 150 ft (QC):  6


Walking 10ft on uneven surface:  6


Distance:  300'x2, 120'


Gait Assistive Device:  Cane Single Point


Does the Pt Use a Wheelchair:  No


Wheel 50 ft with 2 turns (QC):  9


Wheel 150 ft (QC):  9


#of Steps:  12


1 Step (curb) (QC):  4


4 Steps (QC):  4


12 Steps (QC):  4


Balance Sitting Static:  Normal


Balance Sitting Dynamic:  Normal


Balance-Standing Static:  Good


Picking up an Object (QC):  6 (using a reacher)





Occupational Therapy


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 At time of

evaluation, he was dependent for footwear, SBA for toileting, bathing, and lower

body dressing and indep with upper body dressing, oral care and eating. During 

his rehab stay, OT focused on endurance, strength, flexibility, activity 

tolerance, balance and safety in order to improve performance and independence 

with self cares and functional mobility. Pt made good progress and met all of 

his long term goals. See below for current levels of assist. Pt will be 

discharging from this facility today and will be discharged from OT at this 

time.


Decreased Activ Tolerance


Eating (QC):  6


Oral Hygiene (QC):  6


Shower/Bathe Self (QC):  6


Upper Body Dressing (QC):  6


Lower Body Dressing (QC):  6


On/Off Footwear (QC):  6 (shoes)


Toileting Hygiene (QC):  5 (Voiding independent.  Set up per pt and nrsg notes 

for ostomy)





PT Long Term Goals


Long Term Goals


PT Long Term Goals Time Frame:  Aug 22, 2022


Roll Left to Right (QC):  6


Sit to Lying (QC):  6


Lying-Sitting on Side/Bed(QC):  6


Sit to Stand (QC):  6


Chair/Bed-to-Chair Xfer(QC):  6


Car Transfer (QC):  6


Does the Patient Walk:  Yes


Walk 10 feet (QC):  6


Walk 10ft-Uneven Surface(QC):  6


Walk 50ft with 2 Turns (QC):  6


Walk 150 ft (QC):  6


Wheel 50 feet with 2 turns (QC:  9


1 Step (curb) (QC):  6


4 Steps (QC):  6


12 Steps (QC):  6


Picking up an Object (QC):  6





OT Long Term Goals


Long Term Goals


Time Frame:  Aug 11, 2022


Eating (FIM):  6


Eating (QC):  6 (met)


Oral Hygiene (QC):  6 (met)


Shower/Bathe Self (QC):  5 (met)


Upper Body Dressing (QC):  6 (met)


Lower Body Dressing (QC):  6 (met)


On/Off Footwear (QC):  6 (shoes only-met)


Toileting(FIM):  6


Toileting Hygiene (QC):  5 (set up for colostomy-met)


Toilet/Commode Transfer (QC):  6 (met)


Add: Edema management to assist with use of L UE during ADLs- goal pt 

independent with HEP


1=Demonstrate adherence to instructed precautions during ADL tasks.


2=Patient will verbalize/demonstrate understanding of assistive 

devices/modifications for ADL.


3=Patient will improve strength/tolerance for activity to enable patient to 

perform ADL's.











Libby Parker OT                 Aug 8, 2022 14:09

## 2022-08-08 NOTE — OCCUPATIONAL THER DAILY NOTE
OT Current Status-Daily Note


Subjective


Pt alert, sitting in recliner.  Pt agrees to therapy.  No c/o pain.  Pt going 

home today.





Mental Status/Objective


Patient Orientation:  Person, Place, Time, Situation


Attachments:  IV





ADL-Treatment


Using SPC or furniture to ambulate around environment, independently.  Pt agrees

to shower.  Pt is independent with all ADLs, except assistance from wife to don 

socks (baseline).  After session, pt sitting in recliner with call light/phone 

in reach.  All needs met.


Therapy Code Descriptions/Definitions 





Functional [place] Measure:


0=Not Assessed/NA        4=Minimal Assistance


1=Total Assistance        5=Supervision or Setup


2=Maximal Assistance  6=Modified [place]


3=Moderate Assistance 7=Complete IndependenceSCALE: Activities may be completed 

with or without assistive devices.





6-Indepedent-patient completes the activity by him/herself with no assistance 

from a helper.


5-Set-up or Clean-up Assistance-helper sets up or cleans up; patient completes 

activity. [place] assists only prior to or  


    following the activity.


4-Supervision or Touching Assistance-helper provides verbal cues and/or 

touching/steadying and/or contact guard assistance as patient completes 

activity. Assistance may be provided   


    throughout the activity or intermittently.


3-Partial/Moderate Assistance-helper does LESS THAN HALF the effort. [place] 

lifts, holds or supports trunk or limbs, but provides less than half the effort.


2-Substantial/Maximal Assistance-helper does MORE THAN HALF the effort. [place] 

lifts or holds trunk or limbs and provides more than half the effort.


9-Vaypoowye-kazypd does ALL the effort. Patient does none of the effort to 

complete the activity. Or, the assistance of 2 or more helpers is required for 

the patient to complete the  


    activity.


If activity was not attempted, code reason:


7-Patient Refused.


9-Not Applicable-not attempted and the patient did not perform the activity 

before the current illness, exacerbation or injury.


10-Not Attempted due to Environmental Limitations-(lack of equipment, weather 

restraints, etc.).


88-Not Attempted due to Medical Conditions or Safety Concerns.


Eating (QC):  6


Oral Hygiene (QC):  6


Shower/Bathe Self (QC):  6


Upper Body Dressing (QC):  6


Lower Body Dressing (QC):  6


On/Off Footwear:  6 (shoes)


Toileting Hygiene (QC):  5 (Voiding independent.  Set up per pt and nrsg notes 

for ostomy)


Toilet Transfer (QC):  6


See nrsg notes for ostomy care.





OT Short Term Goals


Short Term Goals


Time Frame:  Aug 5, 2022


Eatin


Oral hygiene:  6


Toileting hygiene:  5


Shower/bathe self:  4


Upper body dressin


Lower body dressin


Putting on/taking off footwear:  5 (shoes only)





OT Long Term Goals


Long Term Goals


Time Frame:  Aug 11, 2022


Eating (QC):  6 (met)


Oral Hygiene (QC):  6 (met)


Toileting Hygiene (QC):  5 (set up for colostomy-met)


Shower/Bathe Self (QC):  5 (met)


Upper Body Dressing (QC):  6 (met)


Lower Body Dressing (QC):  6 (met)


On/Off Footwear (QC):  6 (shoes only-met)


Add: Edema management to assist with use of L UE during ADLs- goal pt 

independent with HEP


1=Demonstrate adherence to instructed precautions during ADL tasks.


2=Patient will verbalize/demonstrate understanding of assistive 

devices/modifications for ADL.


3=Patient will improve strength/tolerance for activity to enable patient to 

perform ADL's.





OT Education/Plan


Problem List/Assessment


Assessment:  Decreased Activ Tolerance





Discharge Recommendations


Plan/Recommendations:  Discharge/Goals Met (pt discharging to home today)





Treatment Plan/Plan of Care


Patient would benefit from OT for education, treatment and training to promote i

ndependence in ADL's, mobility, safety and/or upper extremity function for 

ADL's.


Plan of Care:  ADL Retraining, Caregiver Training, Cognitive Retraining, 

Concurrent Therapy, Functional Mobility, Group Exercise/Act as Ind, UE Funct Exe

rcise/Act


Treatment Duration:  Aug 11, 2022


Frequency:  At least 5 of 7 days/Wk (IRF)


Estimated Hrs Per Day:  1.5 hours per day (75-90 min/day )


Agreement:  Yes


Rehab Potential:  Good





Time/GCodes


Start Time:  07:30


Stop Time:  08:15


Total Time Billed (hr/min):  45


Billed Treatment Time


1 visit-ADL 3 (45 min)











MEG ANNA                Aug 8, 2022 07:59

## 2022-12-15 ENCOUNTER — HOSPITAL ENCOUNTER (OUTPATIENT)
Dept: HOSPITAL 75 - ONC | Age: 82
LOS: 16 days | Discharge: HOME | End: 2022-12-31
Attending: RADIOLOGY
Payer: MEDICARE

## 2022-12-15 DIAGNOSIS — C19: Primary | ICD-10-CM

## 2022-12-15 DIAGNOSIS — I10: ICD-10-CM

## 2022-12-15 DIAGNOSIS — I25.10: ICD-10-CM

## 2022-12-15 DIAGNOSIS — E78.2: ICD-10-CM

## 2022-12-15 PROCEDURE — 99213 OFFICE O/P EST LOW 20 MIN: CPT

## 2023-10-20 ENCOUNTER — HOSPITAL ENCOUNTER (OUTPATIENT)
Dept: HOSPITAL 75 - ER FS | Age: 83
Setting detail: OBSERVATION
LOS: 2 days | Discharge: HOME | End: 2023-10-22
Attending: INTERNAL MEDICINE | Admitting: INTERNAL MEDICINE
Payer: MEDICARE

## 2023-10-20 VITALS — WEIGHT: 205.47 LBS | HEIGHT: 66.02 IN | BODY MASS INDEX: 33.02 KG/M2

## 2023-10-20 VITALS — SYSTOLIC BLOOD PRESSURE: 126 MMHG | DIASTOLIC BLOOD PRESSURE: 66 MMHG

## 2023-10-20 DIAGNOSIS — I48.0: Primary | ICD-10-CM

## 2023-10-20 DIAGNOSIS — Z95.818: ICD-10-CM

## 2023-10-20 DIAGNOSIS — I10: ICD-10-CM

## 2023-10-20 DIAGNOSIS — Z87.891: ICD-10-CM

## 2023-10-20 DIAGNOSIS — U07.1: ICD-10-CM

## 2023-10-20 DIAGNOSIS — Z28.310: ICD-10-CM

## 2023-10-20 DIAGNOSIS — R53.1: ICD-10-CM

## 2023-10-20 DIAGNOSIS — E78.5: ICD-10-CM

## 2023-10-20 LAB
ALBUMIN SERPL-MCNC: 4 GM/DL (ref 3.2–4.5)
ALP SERPL-CCNC: 91 U/L (ref 40–136)
ALT SERPL-CCNC: 19 U/L (ref 0–55)
APTT BLD: 36 SEC (ref 24–35)
BASOPHILS # BLD AUTO: 0 10^3/UL (ref 0–0.1)
BASOPHILS NFR BLD AUTO: 1 % (ref 0–10)
BILIRUB SERPL-MCNC: 0.4 MG/DL (ref 0.1–1)
BUN/CREAT SERPL: 16
CALCIUM SERPL-MCNC: 9.2 MG/DL (ref 8.5–10.1)
CHLORIDE SERPL-SCNC: 103 MMOL/L (ref 98–107)
CO2 SERPL-SCNC: 25 MMOL/L (ref 21–32)
CREAT SERPL-MCNC: 1.07 MG/DL (ref 0.6–1.3)
EOSINOPHIL # BLD AUTO: 0.2 10^3/UL (ref 0–0.3)
EOSINOPHIL NFR BLD AUTO: 3 % (ref 0–10)
GFR SERPLBLD BASED ON 1.73 SQ M-ARVRAT: 68 ML/MIN
GLUCOSE SERPL-MCNC: 196 MG/DL (ref 70–105)
HCT VFR BLD CALC: 40 % (ref 40–54)
HGB BLD-MCNC: 12.6 G/DL (ref 13.3–17.7)
INR PPP: 0.9 (ref 0.8–1.4)
LIPASE SERPL-CCNC: 32 U/L (ref 8–78)
LYMPHOCYTES # BLD AUTO: 1.7 10^3/UL (ref 1–4)
LYMPHOCYTES NFR BLD AUTO: 25 % (ref 12–44)
MAGNESIUM SERPL-MCNC: 2.1 MG/DL (ref 1.6–2.4)
MANUAL DIFFERENTIAL PERFORMED BLD QL: NO
MCH RBC QN AUTO: 30 PG (ref 25–34)
MCHC RBC AUTO-ENTMCNC: 32 G/DL (ref 32–36)
MCV RBC AUTO: 95 FL (ref 80–99)
MONOCYTES # BLD AUTO: 0.7 10^3/UL (ref 0–1)
MONOCYTES NFR BLD AUTO: 11 % (ref 0–12)
NEUTROPHILS # BLD AUTO: 4 10^3/UL (ref 1.8–7.8)
NEUTROPHILS NFR BLD AUTO: 60 % (ref 42–75)
PLATELET # BLD: 191 10^3/UL (ref 130–400)
PMV BLD AUTO: 9.9 FL (ref 9–12.2)
POTASSIUM SERPL-SCNC: 3.9 MMOL/L (ref 3.6–5)
PROT SERPL-MCNC: 7.4 GM/DL (ref 6.4–8.2)
PROTHROMBIN TIME: 13 SEC (ref 12.2–14.7)
SODIUM SERPL-SCNC: 140 MMOL/L (ref 135–145)
WBC # BLD AUTO: 6.6 10^3/UL (ref 4.3–11)

## 2023-10-20 PROCEDURE — 84100 ASSAY OF PHOSPHORUS: CPT

## 2023-10-20 PROCEDURE — 83690 ASSAY OF LIPASE: CPT

## 2023-10-20 PROCEDURE — 84484 ASSAY OF TROPONIN QUANT: CPT

## 2023-10-20 PROCEDURE — 93041 RHYTHM ECG TRACING: CPT

## 2023-10-20 PROCEDURE — 83735 ASSAY OF MAGNESIUM: CPT

## 2023-10-20 PROCEDURE — 85025 COMPLETE CBC W/AUTO DIFF WBC: CPT

## 2023-10-20 PROCEDURE — 99284 EMERGENCY DEPT VISIT MOD MDM: CPT

## 2023-10-20 PROCEDURE — 83880 ASSAY OF NATRIURETIC PEPTIDE: CPT

## 2023-10-20 PROCEDURE — 36415 COLL VENOUS BLD VENIPUNCTURE: CPT

## 2023-10-20 PROCEDURE — 96372 THER/PROPH/DIAG INJ SC/IM: CPT

## 2023-10-20 PROCEDURE — 80053 COMPREHEN METABOLIC PANEL: CPT

## 2023-10-20 PROCEDURE — 93005 ELECTROCARDIOGRAM TRACING: CPT

## 2023-10-20 PROCEDURE — 96365 THER/PROPH/DIAG IV INF INIT: CPT

## 2023-10-20 PROCEDURE — 85610 PROTHROMBIN TIME: CPT

## 2023-10-20 PROCEDURE — 71045 X-RAY EXAM CHEST 1 VIEW: CPT

## 2023-10-20 PROCEDURE — 96374 THER/PROPH/DIAG INJ IV PUSH: CPT

## 2023-10-20 PROCEDURE — 87081 CULTURE SCREEN ONLY: CPT

## 2023-10-20 PROCEDURE — 93306 TTE W/DOPPLER COMPLETE: CPT

## 2023-10-20 PROCEDURE — 96376 TX/PRO/DX INJ SAME DRUG ADON: CPT

## 2023-10-20 PROCEDURE — 85730 THROMBOPLASTIN TIME PARTIAL: CPT

## 2023-10-20 PROCEDURE — 96366 THER/PROPH/DIAG IV INF ADDON: CPT

## 2023-10-20 NOTE — DIAGNOSTIC IMAGING REPORT
INDICATION: 

Cardiac palpitation.



TECHNIQUE/COMPARISON: 

An AP view of the chest was obtained. Comparison is made to the

study of 04/12/2022.



FINDINGS: 

The heart size and pulmonary vascularity are within normal

limits. The lungs are clear bilaterally.  



IMPRESSION: 

Unremarkable chest. 



Dictated by: 



  Dictated on workstation # [id number]

## 2023-10-20 NOTE — ED CARDIAC GENERAL
History of Present Illness


General


Chief Complaint:  Cardiac/General Problems


Stated Complaint:  IRREGULAR HEART BEAT


Source:  patient





History of Present Illness


Date Seen by Provider:  Oct 20, 2023


Time Seen by Provider:  20:46


Initial Comments


83-year-old male presenting with complaints of near syncope 20 to 30 minutes 

prior to arrival.  He feels like his heart is racing and beating irregular in 

his chest.  He feels very fatigued and rundown.  He states that he has long CO

VID and has gotten easily fatigued ever since he had labs.  He had been doing 

more today trying to prepare for an upcoming move.  He denied having chest pain,

nausea, vomiting, abdominal pain, shortness of breath.  He denies having a 

history of atrial fibrillation or irregular heartbeat.


Timing/Duration:  1/2 hour


Severity:  moderate


Prior CP/Workup:  non-cardiac


NTG SL PTA:  No


ASA po PTA:  No


Associated Systoms:  No Chest Pain, No Cough, No Diaphoresis, No Fever/Chills, 

No Headaches, No Loss of Appetite, No Malaise, No Nausea/Vomiting, No Rash, No 

Seizure, No Shortness of Air; Syncope (Near syncope); No Weakness





Allergies and Home Medications


Allergies


Coded Allergies:  


     albuterol (Verified  Allergy, Unknown, Tachycardia , 22)


     aspartame (Verified  Allergy, Unknown, Causes extreme brain fog per pt, 

22)


     morphine (Verified  Allergy, Unknown, 22)


     sucralose (Verified  Allergy, Unknown, Splenda- heart racing, 22)





Patient Home Medication List


Home Medication List Reviewed:  Yes


Acetaminophen (Tylenol) 325 Mg Capsule, 975 MG PO Q8H PRN for PAIN-MILD (1-4), 

(Reported)


   Entered as Reported by: ELI FERNANDO on 22 1252


Ascorbate Calcium (Vitamin C) 500 Mg Tablet, 500 MG PO DAILY, (Reported)


   Entered as Reported by: ELI FERNANDO on 22 1252


Aspirin (Aspirin EC) 81 Mg Tablet.dr, 81 MG PO DAILY, (Reported)


   Entered as Reported by: ELI FERNANDO on 22 1252


Bacitracin (Bacitracin) 500 Unit/Gram Oint...g., 1 APPLIC TP BID, (Reported)


   Entered as Reported by: ELI FERNANDO on 22 1252


Betamethasone Dipropionate (Betamethasone Dipropionate) 0.05 % Cream..g., 1 

APPLIC TP DAILY PRN for SKIN CONDITIONS, (Reported)


   Entered as Reported by: ELI FERNANDO on 22 1252


Cetirizine HCl (Cetirizine HCl) 10 Mg Tablet, 10 MG PO DAILY, (Reported)


   Entered as Reported by: ELI FERNANDO on 22 1252


Cholecalciferol (Vitamin D3) (Vitamin D3) 25 Mcg (1000 Unit) Tablet, 25 MCG PO 

DAILY, (Reported)


   Entered as Reported by: ELI FERNANDO on 22 1252


Diphenoxylate HCl/Atropine (Lomotil 2.5-0.025 mg Tablet) 2.5 Mg-0.025 Mg Tablet,

1 EACH PO BID


   Prescribed by: LUCIAN SAEED on 22 0541


Ferrous Gluconate (Ferrous Gluconate) 324 Mg (37.5 Mg Iron) Tablet, 162 MG PO 

DAILY, (Reported)


   Entered as Reported by: ELI FERNANDO on 22 1252


Gluc/Sergio-MSM#1/C/Gordon/Eduin/Bor (Osteo Bi-Flex Caplet) 750 Mg-644 Mg-30 Mg-1 Mg-

1.5 Mg Tablet, 1 EACH PO DAILY, (Reported)


   Entered as Reported by: ELI FERNANDO on 22 1252


Loperamide HCl (Imodium A-D) 2 Mg Capsule, 4 MG PO QID


   Prescribed by: LUCIAN SAEED on 22 0540


Metoprolol Succinate (Metoprolol Succinate) 100 Mg Tab.er.24h, 100 MG PO DAILY, 

(Reported)


   Entered as Reported by: ELI FERNANDO on 22 1252


Montelukast Sodium (Montelukast Sodium) 10 Mg Tablet, 10 MG PO DAILY, (Reported)


   Entered as Reported by: ELI FERNANDO on 22 1252


Multivitamin (Multivitamin) 1 Each Tablet, 1 EACH PO DAILY, (Reported)


   Entered as Reported by: ELI FERNANDO on 22 1256


Normal Saline (Sodium Chloride) 50 Ml Bag, 500 ML IV UD


   Prescribed by: LUCIAN SAEED on 22 0546


Omega 3 Polyunsat Fatty Acids (Fish Oil 1,000 mg Capsule) 340 Mg-1,000 Mg Cap, 

1,000 MG PO BID, (Reported)


   Entered as Reported by: ELI FERNANDO on 22


Pantoprazole Sodium (Pantoprazole Sodium) 40 Mg Tablet.dr, 40 MG PO DAILY, 

(Reported)


   Entered as Reported by: ELI FERNANDO on 22


Potassium Citrate (Potassium) 99 Mg Capsule, 99 MG PO HS, (Reported)


   Entered as Reported by: ELI FERNANDO on 22


Resveratrol (Resveratrol) 250 Mg Capsule, 250 MG PO DAILY, (Reported)


   Entered as Reported by: ELI FERNANDO on 22


Rosuvastatin Calcium (Rosuvastatin Calcium) 20 Mg Tablet, 20 MG PO DAILY, 

(Reported)


   Entered as Reported by: ELI FERNANDO on 22 125


Tamsulosin HCl (Flomax) 0.4 Mg Cap, 0.4 MG PO DAILY


   Prescribed by: LUCIAN SAEED on 22 05


Tramadol HCl (Tramadol HCl) 50 Mg Tablet, 50 MG PO Q8H PRN for PAIN-MODERATE (5-

7), (Reported)


   Entered as Reported by: ELI FERNANDO on 22


Ubidecarenone (Coq-10) 100 Mg Capsule, 100 MG PO DAILY, (Reported)


   Entered as Reported by: ELI FERNANDO on 22


Vit A/Vit C/Vit E/Zinc/Copper (Icaps Areds Softgel) 14,320-226 Capsule, 1 EACH 

PO DAILY, (Reported)


   Entered as Reported by: ELI FERNANDO on 22


[Bjorn Treeze]  LIQUID, 1-2 EA PO HS, (Reported)


   Entered as Reported by: ELI FERNANDO on 22


[Cramp Defense Magnes]  , 3 EA PO HS, (Reported)


   Entered as Reported by: ELI FERNANDO on 22


[Magic Mouthwash]  SUSP, 10 ML TIDAC, (Reported)


   Entered as Reported by: ELI FERNANDO on 22





Review of Systems


Review of Systems


Constitutional:  No chills, No fever


EENTM:  No Symptoms Reported


Respiratory:  No Symptoms Reported


Cardiovascular:  See HPI


Gastrointestinal:  No Symptoms Reported


Genitourinary:  No Symptoms Reported


Musculoskeletal:  no symptoms reported


Skin:  no symptoms reported


Psychiatric/Neurological:  See HPI





Past Medical-Social-Family Hx


Patient Social History


Tobacco Use?:  No


Use of E-Cig and/or Vaping dev:  No


Substance use?:  No





Seasonal Allergies


Seasonal Allergies:  No





Past Medical History


Surgery/Hospitalization HX:  


HTN, HIGH CHOLESTEROL, RECTAL CANCER, "KIDNEY STONE SURGERY", RIGHT ANKLE  


SURGERY, TONSILLECTOMY, APPENDECTOMY, CHRONIC COUGH, PNEUMONIA.


Surgeries:  Yes (Right ankle repair, Kidney stone retrieval)


Abdominal, Appendectomy, Orthopedic, Tonsillectomy


Respiratory:  No


Cardiac:  Yes


Hypertension


Neurological:  No


Genitourinary:  Yes


Benign Prostatic Hyperpl


Gastrointestinal:  No


Musculoskeletal:  No


Endocrine:  No


HEENT:  No


Cancer:  No


Psychosocial:  No


Blood Disorders:  No





Physical Exam


Vital Signs





Vital Signs - First Documented








 10/20/23





 20:48


 


Temp 37.1


 


Pulse 141


 


Resp 24


 


B/P (MAP) 166/76 (106)


 


Pulse Ox 96


 


O2 Delivery Room Air





Capillary Refill :


Height, Weight, BMI


Height: '"


Weight: lbs. oz. kg; 33.81 BMI


Method:


General Appearance:  No Apparent Distress, WD/WN


HEENT:  PERRL/EOMI, Pharynx Normal


Respiratory:  Chest Non Tender, Lungs Clear, Normal Breath Sounds, No Accessory 

Muscle Use, No Respiratory Distress


Cardiovascular:  No Murmur, Normal Peripheral Pulses, Irregularly Irregular, 

Tachycardia


Gastrointestinal:  Normal Bowel Sounds, No Pulsatile Mass, Non Tender, Soft


Rectal:  Deferred


Extremity:  Normal Capillary Refill, Normal Inspection, No Pedal Edema


Neurologic/Psychiatric:  Alert, Oriented x3, CNs II-XII Norm as Tested


Skin:  Normal Color, Warm/Dry





Progress/Results/Core Measures


Results/Orders


Lab Results





Laboratory Tests








Test


 10/20/23


20:55 Range/Units


 


 


White Blood Count


 6.6 


 4.3-11.0


10^3/uL


 


Red Blood Count


 4.18 L


 4.30-5.52


10^6/uL


 


Hemoglobin 12.6 L 13.3-17.7  g/dL


 


Hematocrit 40  40-54  %


 


Mean Corpuscular Volume 95  80-99  fL


 


Mean Corpuscular Hemoglobin 30  25-34  pg


 


Mean Corpuscular Hemoglobin


Concent 32 


 32-36  g/dL





 


Red Cell Distribution Width 14.6 H 10.0-14.5  %


 


Platelet Count


 191 


 130-400


10^3/uL


 


Mean Platelet Volume 9.9  9.0-12.2  fL


 


Immature Granulocyte % (Auto) 0   %


 


Neutrophils (%) (Auto) 60  42-75  %


 


Lymphocytes (%) (Auto) 25  12-44  %


 


Monocytes (%) (Auto) 11  0-12  %


 


Eosinophils (%) (Auto) 3  0-10  %


 


Basophils (%) (Auto) 1  0-10  %


 


Neutrophils # (Auto)


 4.0 


 1.8-7.8


10^3/uL


 


Lymphocytes # (Auto)


 1.7 


 1.0-4.0


10^3/uL


 


Monocytes # (Auto)


 0.7 


 0.0-1.0


10^3/uL


 


Eosinophils # (Auto)


 0.2 


 0.0-0.3


10^3/uL


 


Basophils # (Auto)


 0.0 


 0.0-0.1


10^3/uL


 


Immature Granulocyte # (Auto)


 0.0 


 0.0-0.1


10^3/uL


 


Prothrombin Time 13.0  12.2-14.7  SEC


 


INR Comment 0.9  0.8-1.4  


 


Activated Partial


Thromboplast Time 36 H


 24-35  SEC





 


Sodium Level 140  135-145  MMOL/L


 


Potassium Level 3.9  3.6-5.0  MMOL/L


 


Chloride Level 103    MMOL/L


 


Carbon Dioxide Level 25  21-32  MMOL/L


 


Anion Gap 12  5-14  MMOL/L


 


Blood Urea Nitrogen 17  7-18  MG/DL


 


Creatinine


 1.07 


 0.60-1.30


MG/DL


 


Estimat Glomerular Filtration


Rate 68 


  





 


BUN/Creatinine Ratio 16   


 


Glucose Level 196 H   MG/DL


 


Calcium Level 9.2  8.5-10.1  MG/DL


 


Corrected Calcium 9.2  8.5-10.1  MG/DL


 


Magnesium Level 2.1  1.6-2.4  MG/DL


 


Total Bilirubin 0.4  0.1-1.0  MG/DL


 


Aspartate Amino Transf


(AST/SGOT) 18 


 5-34  U/L





 


Alanine Aminotransferase


(ALT/SGPT) 19 


 0-55  U/L





 


Alkaline Phosphatase 91    U/L


 


Troponin I < 0.30  <0.30  NG/ML


 


Pro-B-Type Natriuretic Peptide 297.5  <450.0  PG/ML


 


Total Protein 7.4  6.4-8.2  GM/DL


 


Albumin 4.0  3.2-4.5  GM/DL


 


Lipase 32  8-78  U/L








My Orders





Orders - HARDEEP MONTOYA MD


Cbc And Automated Diff (10/20/23 20:46)


Magnesium (10/20/23 20:46)


Ekg Tracing (10/20/23 20:46)


Comprehensive Metabolic Panel (10/20/23 20:46)


Protime With Inr (10/20/23 20:46)


Partial Thromboplastin Time (10/20/23 20:46)


O2 (10/20/23 20:46)


Monitor-Rhythm Ecg Trace Only (10/20/23 20:46)


Ed Iv/Invasive Line Start (10/20/23 20:46)


Lipase (10/20/23 20:46)


Troponin I Fs (10/20/23 20:46)


Probnp Fs (10/20/23 20:46)


Chest 1 View Ap/Pa Only (10/20/23 20:55)


Diltiazem Injection (Diltiazem Injection (10/20/23 20:55)


Ns (Ivpb) 100 Ml (S... W/Diltiazem Iv Fo (10/20/23 20:55)


Enoxaparin Injection (Enoxaparin Injecti (10/20/23 20:55)


Diltiazem Drip Pre-Mix (Diltiazem Drip P (10/20/23 21:09)


Ed Admission (Communication) (10/20/23 21:52)





Medications Given in ED





Current Medications








 Medications  Dose


 Ordered  Sig/Mk


 Route  Start Time


 Stop Time Status Last Admin


Dose Admin


 


 Diltiazem HCl  125 ml @ ud  STK-MED ONCE


 IV  10/20/23 21:09


 10/20/23 21:13 DC 10/20/23 21:15


5 MLS/HR








Vital Signs/I&O











 10/20/23 10/20/23 10/20/23 10/20/23





 20:48 21:07 21:15 22:48


 


Temp 37.1   


 


Pulse 141 120 116 120


 


Resp 24   14


 


B/P (MAP) 166/76 (106) 148/83 146/86 126/66


 


Pulse Ox 96   96


 


O2 Delivery Room Air   Room Air











Admisison Planning


May Need Admission (Planning):  20:50





Progress


Progress Note #1:  


Progress Note


Differential diagnosis of new onset atrial fibrillation, myocardial infarction, 

pneumonia, electrolyte imbalance.





Placed on cardiac telemetry monitor and my initial interpretation is atrial 

fibrillation with rapid ventricular response and heart rate in the 130s.  Obtain

electrocardiogram to look more specifically at his rate and rhythm and look for 

ischemia.  Initiate peripheral IV access and send labs for complete blood count,

comprehensive metabolic profile, lipase, magnesium, coagulation factors, 

troponin, proBNP.  Initial blood pressure is 167/84.  Ordered diltiazem 10 mg IV

bolus dose followed by a drip to help with his rate and rhythm.  Administer 

Lovenox 1 mg/kg or 90 mg subcu x1 as a blood thinner since unsure how long he 

might have been in the atrial fibrillation.  Anticipate admission to the 

hospital in [place] for cardiology evaluation and serial enzymes.


Progress Note #2:  


   Time:  21:09


Progress Note


My personal interpretation and review of his 1 view chest x-ray shows no acute 

infiltrate or effusion. 





 Coagulation factors are not elevated. Comprehensive metabolic profile is 

not showing  any acute significant abnormality.  He has mild glucose elevated to

196. Troponin is negative  at <0.3 and his Magnesium is normal at 2.1. ProBNP is

not elevated. Despite the diltiazem 10 mg bolus and drip at 5 mg/hr he continues

to be in A fib with RVR. 


D/w Dr. Saeed, on call hospitalist for [facility]. Reviewed patient presentation and 

history and treatment course here in the ED. With new onset atrial fibrillation 

with RVR and near syncope will admit  as observation to ICU in [place]. 





 d/w Dr. Palumbo, on call cardiologist, and reviewed patient presentation 

and new onset atrial fibrillation with RVR that possibly started 20 to 30 

minutes prior to arrival when he had a near syncopal event.  He has felt tired 

all day so it is unsure if that was the exact time or if it had been going on 

earlier.  He denies any history of atrial fibrillation in the past.  Advised 

patient did get Lovenox 1 mg/kg continue requested to continue that twice daily 

dosing.  He plans to see him tomorrow morning


Progress Note #3:  


   Time:  22:15


Progress Note


Heart rate continues to fluctuate from 110s to 140s atrial fibrillation with 

RVR.  Will increase the diltiazem drip to 10 mg/h from 5 mg/h.  Blood pressure 

remained stable at 111/57.  Awaiting EMS transport to take patient to ICU in [facility].


Initial ECG Impression Date:  Oct 20, 2023


Initial ECG Impression Time:  20:50


Initial ECG Rate:  140


Initial ECG Rhythm:  A Fib/Flutter


Initial ECG Comparisson:  Changed (2022 tracing showed sinus rhythm)


Comment


Mild nausea interpretation and review of his electrocardiogram shows atrial 

fibrillation with rapid ventricular response and a heart rate of 140 bpm.  He 

has no acute ST elevation.  QT interval 300 ms with a QTc interval 382 ms.  

Compared to his prior tracing from 2022 he had a sinus rhythm at that 

time and is currently in atrial fibrillation.





Diagnostic Imaging





   Diagonstic Imaging:  Xray


   Plain Films/CT/US/NM/MRI:  chest


Comments


                 [facility] [place], [place]





NAME:   ROMAN PAINTING


[facility] REC#:   [id number]


ACCOUNT#:   [id number]


PT STATUS:   REG ER


:   1940


PHYSICIAN:   HARDEEP MONTOYA MD


ADMIT DATE:   10/20/23/ER FS


                                  ***Signed***


Date of Exam:10/20/23





CHEST 1 VIEW AP/PA ONLY








INDICATION: 


Cardiac palpitation.





TECHNIQUE/COMPARISON: 


An AP view of the chest was obtained. Comparison is made to the


study of 2022.





FINDINGS: 


The heart size and pulmonary vascularity are within normal


limits. The lungs are clear bilaterally.  





IMPRESSION: 


Unremarkable chest. 





Dictated by: 





  Dictated on workstation # [id number]








Dict:   10/20/23 2122


Trans:   10/20/23 2207


 [id number]





Interpreted by:     MARGARITA MOLINA MD


Electronically signed by: MARGARITA MOLINA MD 10/20/23 2207


   Reviewed:  Reviewed by Me





Critical Care Note


Critical Care


Total Time (minutes)


45 minutes


Progress


I spent at least 45 minutes of critical care time with the patient.  Time 

excludes separately billable procedures.  Time was spent obtaining history and 

information from the patient and family, ordering tests and reviewing results, 

ordering interventions and reviewing response, discussion with consultants, 

documentation in the chart.  Patient was at risk of hemodynamic collapse and 

compromised with this atrial fibrillation with rapid ventricular response.  He 

is requiring Cardizem drip and admission.  He required my immediate and direct 

intervention and management to help stabilize his condition and arrange for 

transfer to higher level of care.





Departure


Communication (Admissions)


Time/Spoke to Admitting Phy:  21:51


D/w Dr. Saeed, on call hospitalist for [facility]. Reviewed patient presentation and 

history and treatment course here in the ED. With new onset atrial fibrillation 

with RVR and near syncope will admit  as observation to ICU in [place]. 





 d/w Dr. Palumbo, on call cardiologist, and reviewed patient presentation 

and new onset atrial fibrillation with RVR that possibly started 20 to 30 

minutes prior to arrival when he had a near syncopal event.  He has felt tired 

all day so it is unsure if that was the exact time or if it had been going on 

earlier.  He denies any history of atrial fibrillation in the past.  Advised 

patient did get Lovenox 1 mg/kg continue requested to continue that twice daily 

dosing.  He plans to see him tomorrow morning





Impression





   Primary Impression:  


   Paroxysmal atrial fibrillation with rapid ventricular response


   Additional Impression:  


   Near syncope


Disposition:  30 STILL A PATIENT


Condition:  Critical





Admissions


Decision to Admit Reason:  Admit from ER (General)


Decision to Admit/Date:  Oct 20, 2023


Time/Decision to Admit Time:  21:51





Departure-Patient Inst.


Referrals:  


REBEKA ALMENDAREZ MD (PCP/Family)


Primary Care Physician











HARDEEP MONTOYA MD               Oct 20, 2023 21:03

## 2023-10-21 LAB
ALBUMIN SERPL-MCNC: 3.2 GM/DL (ref 3.2–4.5)
ALP SERPL-CCNC: 63 U/L (ref 40–136)
ALT SERPL-CCNC: 13 U/L (ref 0–55)
BASOPHILS # BLD AUTO: 0.1 10^3/UL (ref 0–0.1)
BASOPHILS NFR BLD AUTO: 1 % (ref 0–10)
BILIRUB SERPL-MCNC: 0.4 MG/DL (ref 0.1–1)
BUN/CREAT SERPL: 17
CALCIUM SERPL-MCNC: 8.8 MG/DL (ref 8.5–10.1)
CHLORIDE SERPL-SCNC: 110 MMOL/L (ref 98–107)
CO2 SERPL-SCNC: 25 MMOL/L (ref 21–32)
CREAT SERPL-MCNC: 1.01 MG/DL (ref 0.6–1.3)
EOSINOPHIL # BLD AUTO: 0.2 10^3/UL (ref 0–0.3)
EOSINOPHIL NFR BLD AUTO: 3 % (ref 0–10)
GFR SERPLBLD BASED ON 1.73 SQ M-ARVRAT: 73 ML/MIN
GLUCOSE SERPL-MCNC: 111 MG/DL (ref 70–105)
HCT VFR BLD CALC: 36 % (ref 40–54)
HGB BLD-MCNC: 11.5 G/DL (ref 13.3–17.7)
LYMPHOCYTES # BLD AUTO: 1.5 10^3/UL (ref 1–4)
LYMPHOCYTES NFR BLD AUTO: 24 % (ref 12–44)
MAGNESIUM SERPL-MCNC: 2 MG/DL (ref 1.6–2.4)
MANUAL DIFFERENTIAL PERFORMED BLD QL: NO
MCH RBC QN AUTO: 31 PG (ref 25–34)
MCHC RBC AUTO-ENTMCNC: 32 G/DL (ref 32–36)
MCV RBC AUTO: 95 FL (ref 80–99)
MONOCYTES # BLD AUTO: 0.9 10^3/UL (ref 0–1)
MONOCYTES NFR BLD AUTO: 15 % (ref 0–12)
NEUTROPHILS # BLD AUTO: 3.6 10^3/UL (ref 1.8–7.8)
NEUTROPHILS NFR BLD AUTO: 57 % (ref 42–75)
PHOSPHATE SERPL-MCNC: 4 MG/DL (ref 2.3–4.7)
PLATELET # BLD: 182 10^3/UL (ref 130–400)
PMV BLD AUTO: 9.9 FL (ref 9–12.2)
POTASSIUM SERPL-SCNC: 4.2 MMOL/L (ref 3.6–5)
PROT SERPL-MCNC: 6 GM/DL (ref 6.4–8.2)
SODIUM SERPL-SCNC: 143 MMOL/L (ref 135–145)
WBC # BLD AUTO: 6.2 10^3/UL (ref 4.3–11)

## 2023-10-21 RX ADMIN — DOCUSATE SODIUM SCH MG: 100 CAPSULE ORAL at 09:09

## 2023-10-21 RX ADMIN — POTASSIUM CHLORIDE SCH MEQ: 1500 TABLET, EXTENDED RELEASE ORAL at 05:29

## 2023-10-21 RX ADMIN — DOCUSATE SODIUM SCH MG: 100 CAPSULE ORAL at 19:52

## 2023-10-21 RX ADMIN — POTASSIUM CHLORIDE SCH MLS/HR: 200 INJECTION, SOLUTION INTRAVENOUS at 05:29

## 2023-10-21 RX ADMIN — Medication SCH MLS/HR: at 00:00

## 2023-10-21 RX ADMIN — MAGNESIUM SULFATE IN DEXTROSE SCH MLS/HR: 10 INJECTION, SOLUTION INTRAVENOUS at 05:29

## 2023-10-21 RX ADMIN — Medication SCH MLS/HR: at 23:55

## 2023-10-21 NOTE — HISTORY & PHYSICAL-HOSPITALIST
NENA MARTÍNEZ MD,RESIDENT 10/21/23 1001:


History of Present Illness


HPI/Chief Complaint


CC: Near syncope, palpitations





HPI:


Pt is an 83-year-old male presenting with complaints of near syncope 20 to 30 

minutes prior to arrival to the ED 10/20, as well as palpitations.  He felt like

his heart was racing and beating irregular in his chest.  He felt very fatigued 

and rundown.  He states that he has long COVID and has gotten easily fatigued 

ever since.  He had been doing more physical labor during the last week, trying 

to prepare for an upcoming move.  He denied having chest pain, nausea, vomiting,

abdominal pain, shortness of breath.  He denies having a history of atrial 

fibrillation, heart failure. He did report that a few years ago he was placed on

a loop recorder for several days, however does not recall why he was placed on 

it but states they did not find anything. Will admit for observation.


Source:  patient


Date Seen


10/21/23


Time Seen by a Provider:  09:30


Attending Physician


Marino Singletary MD


PCP


Admitting Physician:


Mona Saeed DO 








Attending Physician:


Mona Saeed DO


Referring Physician





Date of Admission


Oct 20, 2023 at 23:30





Home Medications & Allergies


Home Medications


Reviewed patient Home Medication Reconciliation performed by pharmacy medication

reconciliations technician and/or nursing.


Patients Allergies have been reviewed.





Allergies





Allergies


Coded Allergies


  albuterol (Verified Allergy, Unknown, Tachycardia , 8/1/22)


  aspartame (Verified Allergy, Unknown, Causes extreme brain fog per pt, 8/1/22)


  morphine (Verified Allergy, Unknown, 8/1/22)


  sucralose (Verified Allergy, Unknown, Splenda- heart racing, 8/1/22)








Past Medical-Social-Family Hx


Patient Social History


Tobacco Use?:  No


Use of E-Cig and/or Vaping dev:  No


Substance use?:  No


Alcohol Use?:  No


Pt feels they are or have been:  No





Seasonal Allergies


Seasonal Allergies:  No





Current Status


Advance Directives:  No


Communicates:  Verbally


Primary Language:  English


Preferred Spoken Language:  English


Is interpretation needed?:  No


Sensory deficits:  Vision impairment


Implanted or Applied Medical D:  Orthopedic hardware





Past Medical History


Surgeries:  Abdominal, Appendectomy, Orthopedic, Tonsillectomy


Hypertension


Benign Prostatic Hyperpl


Blood Disorders:  No





Review of Systems


Constitutional:  no symptoms reported


EENTM:  no symptoms reported


Respiratory:  no symptoms reported


Cardiovascular:  no symptoms reported


Gastrointestinal:  no symptoms reported


Genitourinary:  no symptoms reported


Musculoskeletal:  no symptoms reported


Skin:  no symptoms reported


Psychiatric/Neurological:  No Symptoms Reported





Physical Exam


Physical Exam


Vital Signs





Vital Signs - First Documented








 10/20/23 10/21/23





 20:48 02:35


 


Temp 37.1 


 


Pulse 141 


 


Resp 24 


 


B/P (MAP) 166/76 (106) 


 


Pulse Ox 96 


 


O2 Delivery Room Air 


 


FiO2  21





Capillary Refill : Less Than 3 Seconds


Height, Weight, BMI


Height: '"


Weight: lbs. oz. kg; 33.13 BMI


Method:


General Appearance:  No Apparent Distress


HEENT:  Normal ENT Inspection


Neck:  Full Range of Motion


Respiratory:  Chest Non Tender, Lungs Clear, Normal Breath Sounds, No Accessory 

Muscle Use, No Respiratory Distress


Cardiovascular:  Regular Rate, Rhythm, No Murmur, Normal Peripheral Pulses


Gastrointestinal:  Non Tender, Soft


Extremity:  Non Tender, Pedal Edema (+1)


Neurologic/Psychiatric:  Alert, Oriented x3, Normal Mood/Affect


Skin:  Warm/Dry





Results


Results/Procedures


Labs


Laboratory Tests


10/20/23 20:55








10/21/23 04:18








Patient resulted labs reviewed.





Assessment/Plan


Admission Diagnosis


AFib with RVR


Admission Status:  Observation


Reason for Inpatient Admission:  


AFib with RVR





Diagnosis/Problems


Diagnosis/Problems





(1) Atrial fibrillation with RVR


Assessment & Plan:  New onset


Unclear etiology


SR on 10/21


PLAN:


Consult cardiology


Diltiazem gtt - stopped 10/20 evening


EKG


Echo


CTM on telemetry for 24 hrs per cardiology





(2) HTN (hypertension)


Assessment & Plan:  PLAN:


Currently hypotensive, will hold BP medications while inpatient.








Clinical Quality Measures


AMI/AHF:


ASA po Prior to arrival:  MONA Goldberg DO 10/22/23 0608:


History of Present Illness


HPI/Chief Complaint


Chief complaint: New onset A-fib with RVR





HPI: This is an 83-year-old male who has a history of a loop recorder placed 3 

years ago by  Who presents to the ER with palpitations found to have A-

fib with RVR.  Patient was placed on anticoagulation.  Cardiology consulted.  

Patient converted to normal sinus rhythm.


Source:  patient


Exam Limitations:  no limitations





Past Medical-Social-Family Hx


Patient Social History


Marrital Status:  single


Employed/Student:  retired


Smoking Status:  Former Smoker





Past Medical History


High Cholesterol, Hypertension





Review of Systems


Constitutional:  see HPI, malaise, weakness


Cardiovascular:  palpitations





Physical Exam


Physical Exam


General Appearance:  No Apparent Distress, Chronically ill


Eyes:  Right Eye Normal Inspection, Right Eye PERRL


HEENT:  PERRL/EOMI, Normal ENT Inspection, Pharynx Normal, Moist Mucous 

Membranes


Neck:  Full Range of Motion, Normal Inspection, Non Tender


Respiratory:  Chest Non Tender, Lungs Clear, Normal Breath Sounds, No Accessory 

Muscle Use, No Respiratory Distress


Cardiovascular:  Regular Rate, Rhythm, No Edema, No Gallop, No JVD, No Murmur, 

Normal Peripheral Pulses


Gastrointestinal:  Normal Bowel Sounds, No Organomegaly, No Pulsatile Mass, Non 

Tender, Soft


Back:  Normal Inspection, No CVA Tenderness, No Vertebral Tenderness


Extremity:  Normal Capillary Refill, Normal Inspection, Normal Range of Motion, 

Non Tender, No Calf Tenderness, No Pedal Edema


Neurologic/Psychiatric:  Alert, Oriented x3, No Motor/Sensory Deficits, Normal 

Mood/Affect


Skin:  Normal Color, Warm/Dry


Lymphatic:  No Adenopathy





Assessment/Plan


Admission Diagnosis


Assessment:


New onset A-fib with RVR


Hypertension


Loop recorder from 3 years ago


Advanced age


Long COVID





Plan:


ICU


Cardiology consult


Echo





I personally performed the key portions of the visit, discussed case with 

resident and concur with resident documentation of history, physical exam, 

assessment and treatment plan unless otherwise noted.





Admission Status:  Inpatient Order (span 2 midnights)


Reason for Inpatient Admission:  


A-fib with RVR











NENA MARTÍNEZ MD,RESIDENT Oct 21, 2023 10:01


MONA SAEED DO                Oct 22, 2023 06:08

## 2023-10-21 NOTE — CONSULTATION-CARDIOLOGY
HPI-Cardiology


Cardiology Consultation


Date of Consultation


10/21/23


Date of Admission





Time Seen by Provider:  09:30





HPI


Patient is an 83-year-old gentleman with a history of hypertension, 

hyperlipidemia, rectal cancer, status post appendectomy who presented for 

evaluation of presyncope palpitations and fatigue.  Patient states that he was 

at home with his family.  They were sitting on the table.  He felt acute onset 

palpitations presyncope and fatigue.  In that setting he became concerned and 

came to the emergency room for evaluation.  In the emergency room he was found 

to be in atrial fibrillation with rapid ventricular response to 140 initial EKG 

demonstrates atrial fibrillation with rapid ventricular response and ST 

depressions likely rate related.  He was started on a diltiazem drip and 

converted to normal sinus rhythm at approximately 11:30 PM upon transfer to the 

ICU he states that that was his first episode of anything similar to this.  He 

has never had atrial fibrillation or atrial flutter in the past.  Otherwise, he 

denies any chest discomfort, shortness of breath, syncope, PND, orthopnea, lower

extremity edema





Home Medications & Allergies


Allergies:  


Coded Allergies:  


     albuterol (Verified  Allergy, Unknown, Tachycardia , 8/1/22)


     aspartame (Verified  Allergy, Unknown, Causes extreme brain fog per pt, 

8/1/22)


     morphine (Verified  Allergy, Unknown, 8/1/22)


     sucralose (Verified  Allergy, Unknown, Splenda- heart racing, 8/1/22)





PMH-Social-Family Hx


Patient Social History


Recent Hopitalizations:  No


Alcohol Use?:  No





Family Medical History


Family Medical Hx


No CAD or CVA in his family





Review of Systems-General


Review of Systems


Constitutional:  no symptoms reported


EENTM:  no symptoms reported


Respiratory:  no symptoms reported


Cardiovascular:  no symptoms reported


Gastrointestinal:  no symptoms reported


Genitourinary:  no symptoms reported


Musculoskeletal:  no symptoms reported


Skin:  no symptoms reported


Psychiatric/Neurological:  No Symptoms Reported


All systems were reviewed and are negative except for what is been described in 

HPI.





All Other Systems Reviewed


Negative Unless Noted:  Yes





ECG Impression


ECG


Comment


EKG #1 demonstrated atrial fibrillation with rapid ventricular response at 140 

bpm, ST depressions diffusely, rate related





EKG #2 demonstrates normal sinus rhythm with first-degree AV block early R wave 

progression across precordium with resolution of ST segment changes





Physical Exam


Physical Exam


Vital Signs





Vital Signs - First Documented








 10/20/23 10/21/23 10/21/23





 20:48 02:35 18:50


 


Temp 37.1  


 


Pulse 141  


 


Resp 24  


 


B/P (MAP) 166/76 (106)  


 


Pulse Ox 96  


 


O2 Delivery Room Air  


 


O2 Flow Rate   0.00


 


FiO2  21 





Capillary Refill : Less Than 3 Seconds


Height, Weight, BMI


Height: '"


Weight: lbs. oz. kg; 33.13 BMI


Method:


General Appearance:  No Apparent Distress


HEENT:  Normal ENT Inspection


Neck:  Full Range of Motion


Respiratory:  Chest Non Tender, Lungs Clear, Normal Breath Sounds, No Accessory 

Muscle Use, No Respiratory Distress


Cardiovascular:  Regular Rate, Rhythm, No Murmur, Normal Peripheral Pulses


Gastrointestinal:  Non Tender, Soft


Rectal:  Deferred


Extremity:  Non Tender, Pedal Edema (+1)


Neurologic/Psychiatric:  Alert, Oriented x3, Normal Mood/Affect


Skin:  Warm/Dry


Comments


Gen: NAD, resting comfortably; A+oX3


Neck: No bruits, no JVD


Lungs: Normal breath sounds, good air movement. no wheezing rales or rhonchi.


CV: nl s1/s2; no murmurs gallops or rubs.  Regular rate and rhythm


Abd: Soft nontender nondistended, no hepatosplenomegaly, positive bowel 

sounds.;


Ext: 2+ radial and DP pulses; no c-c, wwp, no edema





A/P-Cardiology


Assessment/Plan


83-year-old gentleman with a history of hypertension, hyperlipidemia, rectal 

cancer, status post appendectomy who presented for evaluation of presyncope 

palpitations and fatigue.








##Atrial fibrillation: Lone A-fib as per the patient.  No episodes in the past. 

Currently on Lovenox 90 mix per cake twice daily.  Recommend continued telemetry

monitoring for another 24 hours.  If no further atrial fibrillation or atrial 

flutter, start aspirin 81 and hold on full anticoagulation. If further episodes 

of atrial fibrillation are noted on telemetry, recommend starting  

anticoagulation at that time.  He  would qualify for Eliquis 5 mg p.o. twice 

daily.  he has a good warning system for when he is in atrial fibrillation.  

Start aspirin 81 when Lovenox is discontinued. Trans thoracic echocardiogram 

performed and demonstrates normal LV RV size and function.  Recommend initiation

of Toprol XL 75 mg p.o. twice daily which is a 50% increase above his home 

dosing.





Clinical Quality Measures


AMI/AHF:


ASA po Prior to arrival:  CLEMENT Cary MD         Oct 21, 2023 7:22 pm

## 2023-10-21 NOTE — TELE-ICU PROGRESS NOTE
Progress Note


video rounds completed





82 y/o male admitted with a fib/rvr and syncope.


Placed on diltiazem drip and lovenox 90mg q12.





Currently in NSR





PE:


lying comfortably in bed


HR: 53 NSR with clear p-waves


BP: 99/48


O2 sat: 96%.





All labs unremarkable








PLAN: awaiting cardiology evaulation.


Currently no acute problem





I am monitoring this patient remotely from another state and the basis for this 

evaluation was performed using nursing data and chart review along with video 

evaluation. I am not able to directly examine the patient.





Time spent in review: 15 minutes





Focused Exam


Height, Weight, BMI


Height: '"


Weight: lbs. oz. kg; 33.13 BMI


Method:





Labs


Laboratory Tests


10/20/23 20:55








10/21/23 04:18











Results


Results/Procedures


Labs


Laboratory Tests


10/20/23 20:55








10/21/23 04:18








Patient resulted labs reviewed.





Results


Labs


Labs


Laboratory Tests


10/20/23 20:55: 


White Blood Count 6.6, Red Blood Count 4.18L, Hemoglobin 12.6L, Hematocrit 40, 

Mean Corpuscular Volume 95, Mean Corpuscular Hemoglobin 30, Mean Corpuscular 

Hemoglobin Concent 32, Red Cell Distribution Width 14.6H, Platelet Count 191, 

Mean Platelet Volume 9.9, Immature Granulocyte % (Auto) 0, Neutrophils (%) 

(Auto) 60, Lymphocytes (%) (Auto) 25, Monocytes (%) (Auto) 11, Eosinophils (%) 

(Auto) 3, Basophils (%) (Auto) 1, Neutrophils # (Auto) 4.0, Lymphocytes # (Auto)

1.7, Monocytes # (Auto) 0.7, Eosinophils # (Auto) 0.2, Basophils # (Auto) 0.0, 

Immature Granulocyte # (Auto) 0.0, Prothrombin Time 13.0, INR Comment 0.9, 

Activated Partial Thromboplast Time 36H, Sodium Level 140, Potassium Level 3.9, 

Chloride Level 103, Carbon Dioxide Level 25, Anion Gap 12, Blood Urea Nitrogen 

17, Creatinine 1.07, Estimat Glomerular Filtration Rate 68, BUN/Creatinine Ratio

16, Glucose Level 196H, Calcium Level 9.2, Corrected Calcium 9.2, Magnesium 

Level 2.1, Total Bilirubin 0.4, Aspartate Amino Transf (AST/SGOT) 18, Alanine 

Aminotransferase (ALT/SGPT) 19, Alkaline Phosphatase 91, Troponin I < 0.30, 

Pro-B-Type Natriuretic Peptide 297.5, Total Protein 7.4, Albumin 4.0, Lipase 32


10/21/23 04:18: 


White Blood Count 6.2, Red Blood Count 3.77L, Hemoglobin 11.5L, Hematocrit 36L, 

Mean Corpuscular Volume 95, Mean Corpuscular Hemoglobin 31, Mean Corpuscular 

Hemoglobin Concent 32, Red Cell Distribution Width 14.5, Platelet Count 182, 

Mean Platelet Volume 9.9, Immature Granulocyte % (Auto) 0, Neutrophils (%) 

(Auto) 57, Lymphocytes (%) (Auto) 24, Monocytes (%) (Auto) 15H, Eosinophils (%) 

(Auto) 3, Basophils (%) (Auto) 1, Neutrophils # (Auto) 3.6, Lymphocytes # (Auto)

1.5, Monocytes # (Auto) 0.9, Eosinophils # (Auto) 0.2, Basophils # (Auto) 0.1, 

Immature Granulocyte # (Auto) 0.0, Sodium Level 143, Potassium Level 4.2, 

Chloride Level 110H, Carbon Dioxide Level 25, Anion Gap 8, Blood Urea Nitrogen 

17, Creatinine 1.01, Estimat Glomerular Filtration Rate 73, BUN/Creatinine Ratio

17, Glucose Level 111H, Calcium Level 8.8, Corrected Calcium 9.4, Magnesium 

Level 2.0, Total Bilirubin 0.4, Aspartate Amino Transf (AST/SGOT) 15, Alanine 

Aminotransferase (ALT/SGPT) 13, Alkaline Phosphatase 63, Total Protein 6.0L, Al

bumin 3.2, Phosphorus Level 4.0











KAREN KEBEDE MD            Oct 21, 2023 07:20

## 2023-10-21 NOTE — TELE-ICU PROGRESS NOTE
Progress Note


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 

concern for long COVID admitted with new onset afib with RVR. Started on lovenox

and diltiazem gtt. On arrival to /63, HR 71 and sinus. Patient is in no 

distress. 





A/P


- cardiology to see in AM


- lovenox


- diltiazem gtt





patient assessed via real time audiovisual communication system. 


CCT 5 min





Focused Exam


Height, Weight, BMI


Height: '"


Weight: lbs. oz. kg; 33.13 BMI


Method:











JOHN BAILEY MD                Oct 21, 2023 00:37

## 2023-10-22 LAB
ALBUMIN SERPL-MCNC: 3.3 GM/DL (ref 3.2–4.5)
ALP SERPL-CCNC: 54 U/L (ref 40–136)
ALT SERPL-CCNC: 12 U/L (ref 0–55)
BASOPHILS # BLD AUTO: 0.1 10^3/UL (ref 0–0.1)
BASOPHILS NFR BLD AUTO: 1 % (ref 0–10)
BILIRUB SERPL-MCNC: 0.6 MG/DL (ref 0.1–1)
BUN/CREAT SERPL: 20
CALCIUM SERPL-MCNC: 8.9 MG/DL (ref 8.5–10.1)
CHLORIDE SERPL-SCNC: 109 MMOL/L (ref 98–107)
CO2 SERPL-SCNC: 25 MMOL/L (ref 21–32)
CREAT SERPL-MCNC: 0.87 MG/DL (ref 0.6–1.3)
EOSINOPHIL # BLD AUTO: 0.2 10^3/UL (ref 0–0.3)
EOSINOPHIL NFR BLD AUTO: 4 % (ref 0–10)
GFR SERPLBLD BASED ON 1.73 SQ M-ARVRAT: 85 ML/MIN
GLUCOSE SERPL-MCNC: 108 MG/DL (ref 70–105)
HCT VFR BLD CALC: 36 % (ref 40–54)
HGB BLD-MCNC: 11.7 G/DL (ref 13.3–17.7)
LYMPHOCYTES # BLD AUTO: 1.1 10^3/UL (ref 1–4)
LYMPHOCYTES NFR BLD AUTO: 19 % (ref 12–44)
MAGNESIUM SERPL-MCNC: 2 MG/DL (ref 1.6–2.4)
MANUAL DIFFERENTIAL PERFORMED BLD QL: NO
MCH RBC QN AUTO: 31 PG (ref 25–34)
MCHC RBC AUTO-ENTMCNC: 33 G/DL (ref 32–36)
MCV RBC AUTO: 94 FL (ref 80–99)
MONOCYTES # BLD AUTO: 0.8 10^3/UL (ref 0–1)
MONOCYTES NFR BLD AUTO: 13 % (ref 0–12)
NEUTROPHILS # BLD AUTO: 3.9 10^3/UL (ref 1.8–7.8)
NEUTROPHILS NFR BLD AUTO: 64 % (ref 42–75)
PHOSPHATE SERPL-MCNC: 2.9 MG/DL (ref 2.3–4.7)
PLATELET # BLD: 180 10^3/UL (ref 130–400)
PMV BLD AUTO: 9.7 FL (ref 9–12.2)
POTASSIUM SERPL-SCNC: 4.2 MMOL/L (ref 3.6–5)
PROT SERPL-MCNC: 6.1 GM/DL (ref 6.4–8.2)
SODIUM SERPL-SCNC: 142 MMOL/L (ref 135–145)
WBC # BLD AUTO: 6.1 10^3/UL (ref 4.3–11)

## 2023-10-22 RX ADMIN — POTASSIUM CHLORIDE SCH MEQ: 1500 TABLET, EXTENDED RELEASE ORAL at 05:56

## 2023-10-22 RX ADMIN — POTASSIUM CHLORIDE SCH MLS/HR: 200 INJECTION, SOLUTION INTRAVENOUS at 05:56

## 2023-10-22 RX ADMIN — MAGNESIUM SULFATE IN DEXTROSE SCH MLS/HR: 10 INJECTION, SOLUTION INTRAVENOUS at 05:56

## 2023-10-22 RX ADMIN — DOCUSATE SODIUM SCH MG: 100 CAPSULE ORAL at 08:00

## 2023-10-22 NOTE — CARDIOLOGY PROGRESS NOTE
Subjective


Date Seen by Provider:  Oct 22, 2023


Time Seen by Provider:  05:45


Subjective/Events-last exam


No acute issues overnight.  Maintaining normal sinus rhythm throughout the night

stone telemetry review.





Objective-Cardiology


Exam


Last Set of Vital Signs





Vital Signs








 10/21/23 10/21/23 10/21/23 10/22/23





 02:35 16:25 18:50 04:00


 


Temp  36.1  


 


Pulse    56


 


Resp    17


 


B/P (MAP)    107/56 (73)


 


Pulse Ox    96


 


O2 Delivery    Room Air


 


O2 Flow Rate   0.00 


 


FiO2 21   








I&O











Intake and Output 


 


 10/22/23





 00:00


 


Intake Total 1077 ml


 


Output Total 1400 ml


 


Balance -323 ml


 


 


 


Intake Oral 1052 ml


 


IV Total 25 ml


 


Output Urine Total 1400 ml


 


# Voids 1


 


# Bowel Movements 1








Other physical findings


Gen: NAD, resting comfortably; nonverbal; intermittent moaning


Neck: No bruits, no JVD


Lungs: Normal breath sounds, good air movement. no wheezing rales or rhonchi.\


CV: nl s1/s2; no  gallops or rubs..  2 out of 6 systolic murmur heard best along

the left sternal border.


Abd: Soft nontender nondistended, no hepatosplenomegaly, positive bowel 

sounds.;Ext: 2+ radial and DP pulses; no c-c, wwp, 1+ BLE edema





Results


Lab


Laboratory Tests


10/22/23 04:12














A/P-Cardiology


Assessment/Plan


83-year-old gentleman with a history of hypertension, hyperlipidemia, rectal 

cancer, status post appendectomy who presented for evaluation of presyncope 

palpitations and fatigue.








##Atrial fibrillation with RVR- Lone A-fib: Per patient, no previous episodes of

similar.  Suspect this is lone A-fib.  No further documented A-fib on the 

monitor.  Heart rates currently in the 50s to 60s.  Continue Toprol-XL 75 mg 

p.o. twice daily.  Discontinue Lovenox 80 mg SQ twice daily and transition to 

aspirin 81 mg p.o. daily for now if further episodes of atrial fibrillation, 

transition to Eliquis 5 mg p.o. twice daily.  Continue telemetry for now











CLEMENT DOMINGUEZ MD         Oct 22, 2023 6:39 am

## 2023-10-22 NOTE — PROGRESS NOTE - HOSPITALIST
Subjective


HPI/CC On Admission


Date Seen by Provider:  Oct 22, 2023


Time Seen by Provider:  09:00


Chief complaint: New onset A-fib with RVR





HPI: This is an 83-year-old male who has a history of a loop recorder placed 3 

years ago by  Who presents to the ER with palpitations found to have A-

fib with RVR.  Patient was placed on anticoagulation.  Cardiology consulted.  

Patient converted to normal sinus rhythm.





Objective


Exam


Vital Signs





Vital Signs








  Date Time  Temp Pulse Resp B/P (MAP) Pulse Ox O2 Delivery O2 Flow Rate FiO2


 


10/22/23 08:04     98 Room Air 0.00 


 


10/22/23 08:00 36.7 71 18 145/74 (97)    


 


10/21/23 02:35        21





Capillary Refill : Less Than 3 Seconds





Results/Procedures


Lab


Laboratory Tests


10/22/23 04:12








Patient resulted labs reviewed.





Clinical Quality Measures


AMI/AHF:


ASA po Prior to arrival:  LUCIAN Goldberg DO                Oct 22, 2023 06:20

## 2023-10-22 NOTE — DISCHARGE SUMMARY
Diagnosis/Chief Complaint


Date of Admission


Oct 20, 2023 at 23:30


Date of Discharge





Discharge Date:  Oct 22, 2023


Discharge Diagnosis


Atrial fibrillation new onset with RVR


Long COVID with residual weakness


Hypertension


Hyperlipidemia





Plan:


Discharge home





Discharge Summary


Discharge Physical Examination


Allergies:  


Coded Allergies:  


     albuterol (Verified  Allergy, Unknown, Tachycardia , 8/1/22)


     aspartame (Verified  Allergy, Unknown, Causes extreme brain fog per pt, 

8/1/22)


     morphine (Verified  Allergy, Unknown, 8/1/22)


     sucralose (Verified  Allergy, Unknown, Splenda- heart racing, 8/1/22)


Vitals & I&Os





Vital Signs








  Date Time  Temp Pulse Resp B/P (MAP) Pulse Ox O2 Delivery O2 Flow Rate FiO2


 


10/22/23 12:17        


 


10/22/23 08:04     98 Room Air 0.00 


 


10/22/23 08:00 36.7 71 18     


 


10/21/23 02:35        21








General Appearance:  Alert, Oriented X3, Cooperative


Respiratory:  Clear to Auscultation


Cardiovascular:  Regular Rate


Psych/Mental Status:  Mental Status NL





Hospital Course


Was the Problem List Reviewed?:  Yes


Patient was admitted for new onset A-fib with RVR placed on Cardizem drip and he

converted to normal sinus rhythm.  Oral anticoagulation indicated so he was 

placed on Eliquis sent to [facility]  in [place] and patient was deemed 

stable for discharge.


Labs (last 24 hrs)


Laboratory Tests


10/20/23 20:55: 


White Blood Count 6.6, Red Blood Count 4.18L, Hemoglobin 12.6L, Hematocrit 40, 

Mean Corpuscular Volume 95, Mean Corpuscular Hemoglobin 30, Mean Corpuscular 

Hemoglobin Concent 32, Red Cell Distribution Width 14.6H, Platelet Count 191, 

Mean Platelet Volume 9.9, Immature Granulocyte % (Auto) 0, Neutrophils (%) 

(Auto) 60, Lymphocytes (%) (Auto) 25, Monocytes (%) (Auto) 11, Eosinophils (%) 

(Auto) 3, Basophils (%) (Auto) 1, Neutrophils # (Auto) 4.0, Lymphocytes # (Auto)

1.7, Monocytes # (Auto) 0.7, Eosinophils # (Auto) 0.2, Basophils # (Auto) 0.0, 

Immature Granulocyte # (Auto) 0.0, Prothrombin Time 13.0, INR Comment 0.9, 

Activated Partial Thromboplast Time 36H, Sodium Level 140, Potassium Level 3.9, 

Chloride Level 103, Carbon Dioxide Level 25, Anion Gap 12, Blood Urea Nitrogen 

17, Creatinine 1.07, Estimat Glomerular Filtration Rate 68, BUN/Creatinine Ratio

16, Glucose Level 196H, Calcium Level 9.2, Corrected Calcium 9.2, Magnesium 

Level 2.1, Total Bilirubin 0.4, Aspartate Amino Transf (AST/SGOT) 18, Alanine 

Aminotransferase (ALT/SGPT) 19, Alkaline Phosphatase 91, Troponin I < 0.30, 

Pro-B-Type Natriuretic Peptide 297.5, Total Protein 7.4, Albumin 4.0, Lipase 32


10/21/23 04:18: 


White Blood Count 6.2, Red Blood Count 3.77L, Hemoglobin 11.5L, Hematocrit 36L, 

Mean Corpuscular Volume 95, Mean Corpuscular Hemoglobin 31, Mean Corpuscular 

Hemoglobin Concent 32, Red Cell Distribution Width 14.5, Platelet Count 182, 

Mean Platelet Volume 9.9, Immature Granulocyte % (Auto) 0, Neutrophils (%) 

(Auto) 57, Lymphocytes (%) (Auto) 24, Monocytes (%) (Auto) 15H, Eosinophils (%) 

(Auto) 3, Basophils (%) (Auto) 1, Neutrophils # (Auto) 3.6, Lymphocytes # (Auto)

1.5, Monocytes # (Auto) 0.9, Eosinophils # (Auto) 0.2, Basophils # (Auto) 0.1, 

Immature Granulocyte # (Auto) 0.0, Sodium Level 143, Potassium Level 4.2, C

hloride Level 110H, Carbon Dioxide Level 25, Anion Gap 8, Blood Urea Nitrogen 

17, Creatinine 1.01, Estimat Glomerular Filtration Rate 73, BUN/Creatinine Ratio

17, Glucose Level 111H, Calcium Level 8.8, Corrected Calcium 9.4, Magnesium 

Level 2.0, Total Bilirubin 0.4, Aspartate Amino Transf (AST/SGOT) 15, Alanine 

Aminotransferase (ALT/SGPT) 13, Alkaline Phosphatase 63, Total Protein 6.0L, 

Albumin 3.2, Phosphorus Level 4.0


10/22/23 04:12: 


White Blood Count 6.1, Red Blood Count 3.81L, Hemoglobin 11.7L, Hematocrit 36L, 

Mean Corpuscular Volume 94, Mean Corpuscular Hemoglobin 31, Mean Corpuscular 

Hemoglobin Concent 33, Red Cell Distribution Width 14.6H, Platelet Count 180, 

Mean Platelet Volume 9.7, Immature Granulocyte % (Auto) 0, Neutrophils (%) 

(Auto) 64, Lymphocytes (%) (Auto) 19, Monocytes (%) (Auto) 13H, Eosinophils (%) 

(Auto) 4, Basophils (%) (Auto) 1, Neutrophils # (Auto) 3.9, Lymphocytes # (Auto)

1.1, Monocytes # (Auto) 0.8, Eosinophils # (Auto) 0.2, Basophils # (Auto) 0.1, 

Immature Granulocyte # (Auto) 0.0, Sodium Level 142, Potassium Level 4.2, 

Chloride Level 109H, Carbon Dioxide Level 25, Anion Gap 8, Blood Urea Nitrogen 

17, Creatinine 0.87, Estimat Glomerular Filtration Rate 85, BUN/Creatinine Ratio

20, Glucose Level 108H, Calcium Level 8.9, Corrected Calcium 9.5, Magnesium 

Level 2.0, Total Bilirubin 0.6, Aspartate Amino Transf (AST/SGOT) 13, Alanine 

Aminotransferase (ALT/SGPT) 12, Alkaline Phosphatase 54, Total Protein 6.1L, 

Albumin 3.3, Phosphorus Level 2.9





Microbiology


10/21/23 MRSA Screen - Final, Complete


           MRSA not isolated





Pending Labs





Microbiology








 Date/Time


Source Procedure


Growth Status





 


 10/21/23 00:22


Nasal MRSA Screen - Final


MRSA not isolated Complete





Laboratory Tests


10/20/23 20:55: 


White Blood Count 6.6, Red Blood Count 4.18, Hemoglobin 12.6, Hematocrit 40, 

Mean Corpuscular Volume 95, Mean Corpuscular Hemoglobin 30, Mean Corpuscular 

Hemoglobin Concent 32, Red Cell Distribution Width 14.6, Platelet Count 191, 

Mean Platelet Volume 9.9, Immature Granulocyte % (Auto) 0, Neutrophils (%) 

(Auto) 60, Lymphocytes (%) (Auto) 25, Monocytes (%) (Auto) 11, Eosinophils (%) 

(Auto) 3, Basophils (%) (Auto) 1, Neutrophils # (Auto) 4.0, Lymphocytes # (Auto)

1.7, Monocytes # (Auto) 0.7, Eosinophils # (Auto) 0.2, Basophils # (Auto) 0.0, 

Immature Granulocyte # (Auto) 0.0, Prothrombin Time 13.0, INR Comment 0.9, 

Activated Partial Thromboplast Time 36, Sodium Level 140, Potassium Level 3.9, 

Chloride Level 103, Carbon Dioxide Level 25, Anion Gap 12, Blood Urea Nitrogen 

17, Creatinine 1.07, Estimat Glomerular Filtration Rate 68, BUN/Creatinine Ratio

16, Glucose Level 196, Calcium Level 9.2, Corrected Calcium 9.2, Magnesium Level

2.1, Total Bilirubin 0.4, Aspartate Amino Transf (AST/SGOT) 18, Alanine 

Aminotransferase (ALT/SGPT) 19, Alkaline Phosphatase 91, Troponin I < 0.30, 

Pro-B-Type Natriuretic Peptide 297.5, Total Protein 7.4, Albumin 4.0, Lipase 32


10/21/23 04:18: 


White Blood Count 6.2, Red Blood Count 3.77, Hemoglobin 11.5, Hematocrit 36, 

Mean Corpuscular Volume 95, Mean Corpuscular Hemoglobin 31, Mean Corpuscular 

Hemoglobin Concent 32, Red Cell Distribution Width 14.5, Platelet Count 182, 

Mean Platelet Volume 9.9, Immature Granulocyte % (Auto) 0, Neutrophils (%) 

(Auto) 57, Lymphocytes (%) (Auto) 24, Monocytes (%) (Auto) 15, Eosinophils (%) 

(Auto) 3, Basophils (%) (Auto) 1, Neutrophils # (Auto) 3.6, Lymphocytes # (Auto)

1.5, Monocytes # (Auto) 0.9, Eosinophils # (Auto) 0.2, Basophils # (Auto) 0.1, 

Immature Granulocyte # (Auto) 0.0, Sodium Level 143, Potassium Level 4.2, 

Chloride Level 110, Carbon Dioxide Level 25, Anion Gap 8, Blood Urea Nitrogen 

17, Creatinine 1.01, Estimat Glomerular Filtration Rate 73, BUN/Creatinine Ratio

17, Glucose Level 111, Calcium Level 8.8, Corrected Calcium 9.4, Magnesium Level

2.0, Total Bilirubin 0.4, Aspartate Amino Transf (AST/SGOT) 15, Alanine 

Aminotransferase (ALT/SGPT) 13, Alkaline Phosphatase 63, Total Protein 6.0, 

Albumin 3.2, Phosphorus Level 4.0


10/22/23 04:12: 


White Blood Count 6.1, Red Blood Count 3.81, Hemoglobin 11.7, Hematocrit 36, 

Mean Corpuscular Volume 94, Mean Corpuscular Hemoglobin 31, Mean Corpuscular 

Hemoglobin Concent 33, Red Cell Distribution Width 14.6, Platelet Count 180, 

Mean Platelet Volume 9.7, Immature Granulocyte % (Auto) 0, Neutrophils (%) 

(Auto) 64, Lymphocytes (%) (Auto) 19, Monocytes (%) (Auto) 13, Eosinophils (%) 

(Auto) 4, Basophils (%) (Auto) 1, Neutrophils # (Auto) 3.9, Lymphocytes # (Auto)

1.1, Monocytes # (Auto) 0.8, Eosinophils # (Auto) 0.2, Basophils # (Auto) 0.1, 

Immature Granulocyte # (Auto) 0.0, Sodium Level 142, Potassium Level 4.2, 

Chloride Level 109, Carbon Dioxide Level 25, Anion Gap 8, Blood Urea Nitrogen 

17, Creatinine 0.87, Estimat Glomerular Filtration Rate 85, BUN/Creatinine Ratio

20, Glucose Level 108, Calcium Level 8.9, Corrected Calcium 9.5, Magnesium Level

2.0, Total Bilirubin 0.6, Aspartate Amino Transf (AST/SGOT) 13, Alanine 

Aminotransferase (ALT/SGPT) 12, Alkaline Phosphatase 54, Total Protein 6.1, 

Albumin 3.3, Phosphorus Level 2.9








Discharge


Home Medications:





Active Scripts


Active


Eliquis (Apixaban) 5 Mg Tablet 5 Mg PO BID


Metoprolol Succinate 25 Mg Tab.er.24h 75 Mg PO BID


Flomax (Tamsulosin HCl) 0.4 Mg Cap 0.4 Mg PO DAILY


Imodium A-D (Loperamide HCl) 2 Mg Capsule 4 Mg PO QID


     TAKES 2 (2MG) TABS


Reported


Rosuvastatin Calcium 20 Mg Tablet 20 Mg PO DAILY


Potassium (Potassium Citrate) 99 Mg Capsule 99 Mg PO HS


Ferrous Gluconate 324 Mg (37.5 Mg Iron) Tablet 162 Mg PO DAILY


     TAKES  OF A 324MG TAB


Coq-10 (Ubidecarenone) 100 Mg Capsule 100 Mg PO DAILY


Aspirin EC (Aspirin) 81 Mg Tablet.dr 81 Mg PO DAILY


     TAKE WITH FOOD


Vitamin C (Ascorbate Calcium) 500 Mg Tablet 500 Mg PO DAILY





Instructions to patient/family


Please see electronic discharge instructions given to patient.





Clinical Quality Measures


AMI/AHF:


ASA po Prior to arrival:  LUCIAN Goldberg DO                Oct 22, 2023 09:53

## 2023-10-22 NOTE — TELE-ICU PROGRESS NOTE
Subjective


Date Seen by a Provider:  Oct 22, 2023


Time Seen by a Provider:  09:58


Subjective/Events-last exam


(Tele-ICU Physician , Progress Note )


Service provided via interactive audio and video telecommunications  [facility] 

system to a patient admitted to ICU bed in [facility].


Patient is seen today due to persistent need of  ICU care








Available chart/ vitals / labs / Images reviewed


Video assessment done using   teleICU camera, rest of exam as per RN





He is a 83-year-old  male with past medical history of fall rectal 

cancer s/p resection with ileostomy placement presented with palpitations and 

irregular heartbeat and found to have atrial fibrillation with rapid ventricular

rate.  He is admitted to ICU and cardiology consultation is requested.  

Initially he was treated with IV diltiazem drip and his heart rate and rhythm 

converted to normal monitor sinus rhythm.  Currently he is on metoprolol 75 mg 

p.o. twice daily with a normal sinus rhythm.  He is in no acute distress.





Impression


1.  Atrial fibrillation with rapid ventricular rate now converted to normal 

sinus rhythm


2.  History of long COVID


3.  History of rectal cancer status postresection with placement of ileostomy.





Recommendations


1.  Continue oral metoprolol per cardiology


2.  Lovenox subcutaneously for DVT prophylaxis and stroke prophylaxis.





Coordination of care with primary care physician and bedside consultants.











I am remotely monitoring this patient from Tele icu station in [place].  I am 

unable to do the bedside exam, and history/physical and pertinent information is

taken from other notes in the computer and bedside staff.





Certain portions of this document may have been dictated utilizing voice 

recognition technology such as Dragon.  Inherent to this technology, 

typographical and grammatical errors may exist.  As much as I am diligent to 

identify and correct to these mistakes, some errors may remain in the document.





Critical care time devoted to this patient today is approximately is--15 minutes





Sepsis Event


Evaluation


Height, Weight, BMI


Height: '"


Weight: lbs. oz. kg; 33.13 BMI


Method:





Exam


Exam


Patient acknowledged, consented, and participated in this virtual visit which 

was conducted using real time audio/video


Vital Signs








  Date Time  Temp Pulse Resp B/P (MAP) Pulse Ox O2 Delivery O2 Flow Rate FiO2


 


10/22/23 08:04     98 Room Air 0.00 


 


10/22/23 08:00 36.7 71 18 145/74 (97) 95 Room Air  


 


10/22/23 07:00  51      


 


10/22/23 04:00  56 17 107/56 (73) 96 Room Air  


 


10/22/23 01:12  54      


 


10/22/23 00:00  69 28 134/72 (92) 98 Room Air  


 


10/21/23 23:48     97 Room Air  


 


10/21/23 19:55     98 Room Air  


 


10/21/23 19:30  67 21 140/67 (91) 99 Room Air  


 


10/21/23 19:00  68      


 


10/21/23 18:50     96 Room Air 0.00 


 


10/21/23 16:25 36.1 61 22 135/69 (99) 98 Room Air  


 


10/21/23 16:00  61 14  98 Room Air  


 


10/21/23 12:19  61      


 


10/21/23 12:00  59 11  97 Room Air  


 


10/21/23 10:00  61 25 139/68 (90) 98 Room Air  














I & O 


 


 10/22/23





 07:00


 


Intake Total 1077 ml


 


Output Total 1650 ml


 


Balance -573 ml








Height & Weight


Height: '"


Weight: lbs. oz. kg; 33.13 BMI


Method:


General Appearance:  No Apparent Distress, Chronically ill


HEENT:  PERRL/EOMI, Normal ENT Inspection, Pharynx Normal, Moist Mucous 

Membranes


Neck:  Full Range of Motion, Normal Inspection, Non Tender


Respiratory:  Chest Non Tender, Lungs Clear, Normal Breath Sounds, No Accessory 

Muscle Use, No Respiratory Distress


Cardiovascular:  Regular Rate, Rhythm, No Edema, No Gallop, No JVD, No Murmur, 

Normal Peripheral Pulses


Capillary Refill:  Less Than 3 Seconds


Extremity:  Normal Capillary Refill, Normal Inspection, Normal Range of Motion, 

Non Tender, No Calf Tenderness, No Pedal Edema


Neurologic/Psychiatric:  Alert, Oriented x3, No Motor/Sensory Deficits, Normal 

Mood/Affect


Skin:  Normal Color, Warm/Dry


Lymphatic:  No Adenopathy





Results


Lab


Laboratory Tests


10/20/23 20:55








10/21/23 04:18








10/22/23 04:12











Assessment/Plan


Assessment/Plan


as above


Critical Care:  Critically Ill Patient


Time spent with patient (mins):  15











HAYDEN NORWOOD MD                Oct 22, 2023 09:58